# Patient Record
Sex: FEMALE | Race: WHITE | Employment: OTHER | URBAN - METROPOLITAN AREA
[De-identification: names, ages, dates, MRNs, and addresses within clinical notes are randomized per-mention and may not be internally consistent; named-entity substitution may affect disease eponyms.]

---

## 2017-02-27 ENCOUNTER — HOSPITAL ENCOUNTER (OUTPATIENT)
Dept: MRI IMAGING | Age: 72
Discharge: HOME | End: 2017-02-27
Attending: ORTHOPAEDIC SURGERY
Payer: MEDICARE

## 2017-02-27 DIAGNOSIS — M54.32 LEFT SIDED SCIATICA: ICD-10-CM

## 2017-02-27 PROCEDURE — 72148 MRI LUMBAR SPINE W/O DYE: CPT

## 2018-11-08 ENCOUNTER — HOSPITAL ENCOUNTER (OUTPATIENT)
Dept: LAB | Age: 73
Discharge: HOME | End: 2018-11-08
Payer: MEDICARE

## 2018-11-08 ENCOUNTER — OFFICE VISIT (OUTPATIENT)
Dept: FAMILY MEDICINE CLINIC | Age: 73
End: 2018-11-08

## 2018-11-08 VITALS
BODY MASS INDEX: 26.68 KG/M2 | HEIGHT: 67 IN | WEIGHT: 170 LBS | DIASTOLIC BLOOD PRESSURE: 67 MMHG | RESPIRATION RATE: 18 BRPM | TEMPERATURE: 98.1 F | HEART RATE: 69 BPM | OXYGEN SATURATION: 98 % | SYSTOLIC BLOOD PRESSURE: 114 MMHG

## 2018-11-08 DIAGNOSIS — Z13.1 SCREENING FOR DIABETES MELLITUS (DM): ICD-10-CM

## 2018-11-08 DIAGNOSIS — J01.00 ACUTE NON-RECURRENT MAXILLARY SINUSITIS: Primary | ICD-10-CM

## 2018-11-08 DIAGNOSIS — R35.0 URINARY FREQUENCY: ICD-10-CM

## 2018-11-08 DIAGNOSIS — Z00.00 ROUTINE HEALTH MAINTENANCE: ICD-10-CM

## 2018-11-08 DIAGNOSIS — E55.9 VITAMIN D DEFICIENCY: ICD-10-CM

## 2018-11-08 DIAGNOSIS — E78.5 HYPERLIPIDEMIA, UNSPECIFIED HYPERLIPIDEMIA TYPE: ICD-10-CM

## 2018-11-08 PROCEDURE — 87086 URINE CULTURE/COLONY COUNT: CPT

## 2018-11-08 PROCEDURE — 80053 COMPREHEN METABOLIC PANEL: CPT

## 2018-11-08 PROCEDURE — 85025 COMPLETE CBC W/AUTO DIFF WBC: CPT

## 2018-11-08 PROCEDURE — 81001 URINALYSIS AUTO W/SCOPE: CPT

## 2018-11-08 PROCEDURE — 82306 VITAMIN D 25 HYDROXY: CPT

## 2018-11-08 PROCEDURE — 80061 LIPID PANEL: CPT

## 2018-11-08 RX ORDER — AMOXICILLIN AND CLAVULANATE POTASSIUM 875; 125 MG/1; MG/1
1 TABLET, FILM COATED ORAL 2 TIMES DAILY
Qty: 14 TAB | Refills: 0 | Status: SHIPPED | OUTPATIENT
Start: 2018-11-08 | End: 2018-11-15

## 2018-11-08 NOTE — PATIENT INSTRUCTIONS
Sinusitis: Care Instructions  Your Care Instructions    Sinusitis is an infection of the lining of the sinus cavities in your head. Sinusitis often follows a cold. It causes pain and pressure in your head and face. In most cases, sinusitis gets better on its own in 1 to 2 weeks. But some mild symptoms may last for several weeks. Sometimes antibiotics are needed. Follow-up care is a key part of your treatment and safety. Be sure to make and go to all appointments, and call your doctor if you are having problems. It's also a good idea to know your test results and keep a list of the medicines you take. How can you care for yourself at home? · Take an over-the-counter pain medicine, such as acetaminophen (Tylenol), ibuprofen (Advil, Motrin), or naproxen (Aleve). Read and follow all instructions on the label. · If the doctor prescribed antibiotics, take them as directed. Do not stop taking them just because you feel better. You need to take the full course of antibiotics. · Be careful when taking over-the-counter cold or flu medicines and Tylenol at the same time. Many of these medicines have acetaminophen, which is Tylenol. Read the labels to make sure that you are not taking more than the recommended dose. Too much acetaminophen (Tylenol) can be harmful. · Breathe warm, moist air from a steamy shower, a hot bath, or a sink filled with hot water. Avoid cold, dry air. Using a humidifier in your home may help. Follow the directions for cleaning the machine. · Use saline (saltwater) nasal washes to help keep your nasal passages open and wash out mucus and bacteria. You can buy saline nose drops at a grocery store or drugstore. Or you can make your own at home by adding 1 teaspoon of salt and 1 teaspoon of baking soda to 2 cups of distilled water. If you make your own, fill a bulb syringe with the solution, insert the tip into your nostril, and squeeze gently. Constantino Mclean your nose.   · Put a hot, wet towel or a warm gel pack on your face 3 or 4 times a day for 5 to 10 minutes each time. · Try a decongestant nasal spray like oxymetazoline (Afrin). Do not use it for more than 3 days in a row. Using it for more than 3 days can make your congestion worse. When should you call for help? Call your doctor now or seek immediate medical care if:    · You have new or worse swelling or redness in your face or around your eyes.     · You have a new or higher fever.    Watch closely for changes in your health, and be sure to contact your doctor if:    · You have new or worse facial pain.     · The mucus from your nose becomes thicker (like pus) or has new blood in it.     · You are not getting better as expected. Where can you learn more? Go to http://ness-lee.info/. Enter J739 in the search box to learn more about \"Sinusitis: Care Instructions. \"  Current as of: March 28, 2018  Content Version: 11.8  © 7765-6134 [employer]. Care instructions adapted under license by [employer] (which disclaims liability or warranty for this information). If you have questions about a medical condition or this instruction, always ask your healthcare professional. [place] [address] any warranty or liability for your use of this information. High Cholesterol: Care Instructions  Your Care Instructions    Cholesterol is a type of fat in your blood. It is needed for many body functions, such as making new cells. Cholesterol is made by your body. It also comes from food you eat. High cholesterol means that you have too much of the fat in your blood. This raises your risk of a heart attack and stroke. LDL and HDL are part of your total cholesterol. LDL is the \"bad\" cholesterol. High LDL can raise your risk for heart disease, heart attack, and stroke. HDL is the \"good\" cholesterol. It helps clear bad cholesterol from the body.  High HDL is linked with a lower risk of heart disease, heart attack, and stroke. Your cholesterol levels help your doctor find out your risk for having a heart attack or stroke. You and your doctor can talk about whether you need to lower your risk and what treatment is best for you. A heart-healthy lifestyle along with medicines can help lower your cholesterol and your risk. The way you choose to lower your risk will depend on how high your risk is for heart attack and stroke. It will also depend on how you feel about taking medicines. Follow-up care is a key part of your treatment and safety. Be sure to make and go to all appointments, and call your doctor if you are having problems. It's also a good idea to know your test results and keep a list of the medicines you take. How can you care for yourself at home? · Eat a variety of foods every day. Good choices include fruits, vegetables, whole grains (like oatmeal), dried beans and peas, nuts and seeds, soy products (like tofu), and fat-free or low-fat dairy products. · Replace butter, margarine, and hydrogenated or partially hydrogenated oils with olive and canola oils. (Canola oil margarine without trans fat is fine.)  · Replace red meat with fish, poultry, and soy protein (like tofu). · Limit processed and packaged foods like chips, crackers, and cookies. · Bake, broil, or steam foods. Don't arnold them. · Be physically active. Get at least 30 minutes of exercise on most days of the week. Walking is a good choice. You also may want to do other activities, such as running, swimming, cycling, or playing tennis or team sports. · Stay at a healthy weight or lose weight by making the changes in eating and physical activity listed above. Losing just a small amount of weight, even 5 to 10 pounds, can reduce your risk for having a heart attack or stroke. · Do not smoke. When should you call for help?   Watch closely for changes in your health, and be sure to contact your doctor if:    · You need help making lifestyle changes.     · You have questions about your medicine. Where can you learn more? Go to http://ness-lee.info/. Enter B719 in the search box to learn more about \"High Cholesterol: Care Instructions. \"  Current as of: December 6, 2017  Content Version: 11.8  © 3285-6853 [employer]. Care instructions adapted under license by [facility] (which disclaims liability or warranty for this information). If you have questions about a medical condition or this instruction, always ask your healthcare professional. [address] any warranty or liability for your use of this information.

## 2018-11-08 NOTE — PROGRESS NOTES
Chief Complaint   Patient presents with    Nasal Congestion     pt states she has had a sinus infection for over a month. lots of post nasal drainage in throat. taking Mucinex    Cough     reports yellow mucus with cough. \"REVIEWED RECORD IN PREPARATION FOR VISIT AND HAVE OBTAINED THE NECESSARY DOCUMENTATION\"  1. Have you been to the ER, urgent care clinic since your last visit? Hospitalized since your last visit? No    2. Have you seen or consulted any other health care providers outside of the [address] [place] [address] since your last visit? Include any pap smears or colon screening.  No

## 2018-11-08 NOTE — PROGRESS NOTES
[facility] Note  Subjective:      Verlean Runner is a 75 y.o. female who presents for an acute visit with the following chief complaints. Chief Complaint   Patient presents with    Nasal Congestion     pt states she has had a sinus infection for over a month. lots of post nasal drainage in throat. taking Mucinex    Cough     reports yellow mucus with cough. Sinus Pain  Patient complains of congestion, facial pain, post nasal drip and sinus pressure. Associated symptoms include right ear tenderness, and intermittent bilateral dull headache. Onset of symptoms was 4 weeks ago, unchanged since that time. She is drinking plenty of fluids. Past history is significant for no history of pneumonia or bronchitis and prone to sinus infections. Patient is non-smoker. She would like to have routine lab work today. She is fasting. She has not had labs in a few years. History of elevated cholesterol but she did not tolerate Crestor secondary to lower extremities muscle weakness, and altered taste with fish oil. She has been off medication for over a year. She has been watching her diet, she is active with ADL's. She is agreeable to return for a CPE to further discuss health maintenance. She is not taking any daily medication at this time. History of osteoarthritis, managed by Ortho. She was told she may need knee and hip replacement but she is delaying for now. History of transverse colon hernia, follow-up by GI. No recent persistant abdominal pain. Current Outpatient Medications   Medication Sig Dispense Refill    amoxicillin-clavulanate (AUGMENTIN) 875-125 mg per tablet Take 1 Tab by mouth two (2) times a day for 7 days. 14 Tab 0     No Known Allergies    ROS:   Complete review of systems was reviewed with pertinent information listed in HPI. Review of Systems   Constitutional: Negative for chills, diaphoresis, fever, malaise/fatigue and weight loss. HENT: Positive for congestion and sinus pain. Negative for ear pain, hearing loss, sore throat and tinnitus. Eyes: Negative for blurred vision. Respiratory: Positive for cough. Negative for sputum production, shortness of breath and wheezing. Cardiovascular: Negative for chest pain and palpitations. Gastrointestinal: Negative for abdominal pain, diarrhea, heartburn, nausea and vomiting. Genitourinary: Positive for frequency. Negative for dysuria, flank pain, hematuria and urgency. Urinates often but drinking lots of water. Musculoskeletal: Negative for myalgias. Skin: Negative for rash. Neurological: Negative for dizziness and headaches. Endo/Heme/Allergies: Negative for polydipsia. Psychiatric/Behavioral: Negative for depression. The patient is not nervous/anxious and does not have insomnia. Objective:     Visit Vitals  /67 (BP 1 Location: Left arm, BP Patient Position: Sitting)   Pulse 69   Temp 98.1 °F (36.7 °C) (Oral)   Resp 18   Ht 5' 7\" (1.702 m)   Wt 170 lb (77.1 kg)   SpO2 98%   BMI 26.63 kg/m²       Vitals and Nurse Documentation reviewed. Physical Exam   Constitutional: She is oriented to person, place, and time and well-developed, well-nourished, and in no distress. Vital signs are normal. She does not have a sickly appearance. HENT:   Head: Normocephalic and atraumatic. Right Ear: Hearing, external ear and ear canal normal. Tympanic membrane is erythematous. Tympanic membrane is not bulging. A middle ear effusion is present. Left Ear: Hearing, external ear and ear canal normal. Tympanic membrane is not erythematous and not bulging. No middle ear effusion. Nose: Mucosal edema present. No rhinorrhea. Right sinus exhibits maxillary sinus tenderness and frontal sinus tenderness. Left sinus exhibits frontal sinus tenderness. Left sinus exhibits no maxillary sinus tenderness.    Mouth/Throat: Uvula is midline, oropharynx is clear and moist and mucous membranes are normal. Mucous membranes are not pale, not dry and not cyanotic. No oropharyngeal exudate, posterior oropharyngeal edema, posterior oropharyngeal erythema or tonsillar abscesses. Right nasal mucosal erythema and edema. Eyes: Conjunctivae are normal. Pupils are equal, round, and reactive to light. Right conjunctiva is not injected. Left conjunctiva is not injected. Neck: Normal range of motion and phonation normal. Neck supple. No tracheal deviation present. No thyromegaly present. Cardiovascular: Normal rate, regular rhythm, S1 normal, S2 normal, normal heart sounds and intact distal pulses. Exam reveals no gallop and no friction rub. No murmur heard. Pulmonary/Chest: Effort normal. No respiratory distress. She has no decreased breath sounds. She has no wheezes. She has no rhonchi. She has no rales. She exhibits no tenderness. Abdominal: Soft. Normal appearance and bowel sounds are normal. She exhibits no distension. There is no hepatosplenomegaly. There is no tenderness. Musculoskeletal: She exhibits no edema. Lymphadenopathy:     She has no cervical adenopathy. Right: No supraclavicular adenopathy present. Left: No supraclavicular adenopathy present. Neurological: She is alert and oriented to person, place, and time. Gait normal.   Skin: Skin is warm, dry and intact. No rash noted. She is not diaphoretic. No cyanosis. Nails show no clubbing. Psychiatric: Mood, memory, affect and judgment normal.   Nursing note and vitals reviewed. Assessment/Plan:     Diagnoses and all orders for this visit:    1. Acute non-recurrent maxillary sinusitis: Acute, sinusitis ongoing for 4 weeks without resolution with conservative therapy. Start antibiotic therapy; Risks and benefits reviewed. No constitutional symptoms. Nasal saline rinses   -     amoxicillin-clavulanate (AUGMENTIN) 875-125 mg per tablet; Take 1 Tab by mouth two (2) times a day for 7 days.     2. Vitamin D deficiency  -     VITAMIN D, 25 HYDROXY    3. Hyperlipidemia, unspecified hyperlipidemia type: History of elevated LDL, did not tolerate Crestor secondary to leg weakness. She has been off therapy for over a year. Following low cholesterol diet, active with ADL's. She would like to have fasting labs. -     LIPID PANEL    4. Screening for diabetes mellitus (DM)  -     METABOLIC PANEL, COMPREHENSIVE    5. Urinary frequency: Chronic urinary frequency without other signs of UTI, will obtain UA today. Screen for Dm as above. -     UA/M W/RFLX CULTURE, ROUTINE    6. Routine health maintenance: She is requesting labs today and she is agreeable to return for CPE in 3 weeks to further discuss health maintenance.    -     METABOLIC PANEL, COMPREHENSIVE  -     CBC WITH AUTOMATED DIFF      Follow-up Disposition:  Return in about 3 weeks (around 11/29/2018) for Routine Physical Exam.    Discussed expected course/resolution/complications of diagnosis in detail with patient.    Medication risks/benefits/costs/interactions/alternatives discussed with patient.    Pt was given an after visit summary which includes diagnoses, current medications & vitals.  Pt expressed understanding with the diagnosis and plan

## 2018-11-10 LAB
25(OH)D3+25(OH)D2 SERPL-MCNC: 25 NG/ML (ref 30–100)
ALBUMIN SERPL-MCNC: 4.2 G/DL (ref 3.5–4.8)
ALBUMIN/GLOB SERPL: 2.3 {RATIO} (ref 1.2–2.2)
ALP SERPL-CCNC: 104 IU/L (ref 39–117)
ALT SERPL-CCNC: 9 IU/L (ref 0–32)
APPEARANCE UR: CLEAR
AST SERPL-CCNC: 17 IU/L (ref 0–40)
BACTERIA #/AREA URNS HPF: NORMAL /[HPF]
BACTERIA UR CULT: NO GROWTH
BASOPHILS # BLD AUTO: 0 X10E3/UL (ref 0–0.2)
BASOPHILS NFR BLD AUTO: 1 %
BILIRUB SERPL-MCNC: 0.4 MG/DL (ref 0–1.2)
BILIRUB UR QL STRIP: NEGATIVE
BUN SERPL-MCNC: 16 MG/DL (ref 8–27)
BUN/CREAT SERPL: 18 (ref 12–28)
CALCIUM SERPL-MCNC: 9.4 MG/DL (ref 8.7–10.3)
CASTS URNS QL MICRO: NORMAL /LPF
CHLORIDE SERPL-SCNC: 108 MMOL/L (ref 96–106)
CHOLEST SERPL-MCNC: 214 MG/DL (ref 100–199)
CO2 SERPL-SCNC: 25 MMOL/L (ref 20–29)
COLOR UR: YELLOW
CREAT SERPL-MCNC: 0.88 MG/DL (ref 0.57–1)
EOSINOPHIL # BLD AUTO: 0.2 X10E3/UL (ref 0–0.4)
EOSINOPHIL NFR BLD AUTO: 4 %
EPI CELLS #/AREA URNS HPF: NORMAL /HPF
ERYTHROCYTE [DISTWIDTH] IN BLOOD BY AUTOMATED COUNT: 13.7 % (ref 12.3–15.4)
GLOBULIN SER CALC-MCNC: 1.8 G/DL (ref 1.5–4.5)
GLUCOSE SERPL-MCNC: 96 MG/DL (ref 65–99)
GLUCOSE UR QL: NEGATIVE
HCT VFR BLD AUTO: 42.2 % (ref 34–46.6)
HDLC SERPL-MCNC: 64 MG/DL
HGB BLD-MCNC: 14.2 G/DL (ref 11.1–15.9)
HGB UR QL STRIP: NEGATIVE
IMM GRANULOCYTES # BLD: 0 X10E3/UL (ref 0–0.1)
IMM GRANULOCYTES NFR BLD: 0 %
INTERPRETATION, 910389: NORMAL
KETONES UR QL STRIP: NEGATIVE
LDLC SERPL CALC-MCNC: 137 MG/DL (ref 0–99)
LEUKOCYTE ESTERASE UR QL STRIP: ABNORMAL
LYMPHOCYTES # BLD AUTO: 1.8 X10E3/UL (ref 0.7–3.1)
LYMPHOCYTES NFR BLD AUTO: 34 %
MCH RBC QN AUTO: 30.5 PG (ref 26.6–33)
MCHC RBC AUTO-ENTMCNC: 33.6 G/DL (ref 31.5–35.7)
MCV RBC AUTO: 91 FL (ref 79–97)
MICRO URNS: ABNORMAL
MONOCYTES # BLD AUTO: 0.5 X10E3/UL (ref 0.1–0.9)
MONOCYTES NFR BLD AUTO: 10 %
NEUTROPHILS # BLD AUTO: 2.6 X10E3/UL (ref 1.4–7)
NEUTROPHILS NFR BLD AUTO: 51 %
NITRITE UR QL STRIP: NEGATIVE
PH UR STRIP: 5.5 [PH] (ref 5–7.5)
PLATELET # BLD AUTO: 198 X10E3/UL (ref 150–379)
POTASSIUM SERPL-SCNC: 4.3 MMOL/L (ref 3.5–5.2)
PROT SERPL-MCNC: 6 G/DL (ref 6–8.5)
PROT UR QL STRIP: NEGATIVE
RBC # BLD AUTO: 4.65 X10E6/UL (ref 3.77–5.28)
RBC #/AREA URNS HPF: NORMAL /HPF
SODIUM SERPL-SCNC: 144 MMOL/L (ref 134–144)
SP GR UR: 1.02 (ref 1–1.03)
TRIGL SERPL-MCNC: 65 MG/DL (ref 0–149)
URINALYSIS REFLEX, 377202: ABNORMAL
UROBILINOGEN UR STRIP-MCNC: 0.2 MG/DL (ref 0.2–1)
VLDLC SERPL CALC-MCNC: 13 MG/DL (ref 5–40)
WBC # BLD AUTO: 5.1 X10E3/UL (ref 3.4–10.8)
WBC #/AREA URNS HPF: NORMAL /HPF

## 2018-11-12 NOTE — PROGRESS NOTES
(Mychart message) Your complete blood count, screening for infection and anemia, is normal. Your urine was within normal limits. Your cholesterol is improved from 2 years ago. Continue to follow a low cholesterol diet and routine exercise. I advise repeat labs in 6 months. Your kidney function, liver function, blood sugar and electrolytes are normal. Your vitamin D is low. I recommend a daily vitamin D3 supplement 8,00 units daily. Please let me know if you have any additional questions.     Darryle Mimes, NP

## 2018-11-29 ENCOUNTER — OFFICE VISIT (OUTPATIENT)
Dept: FAMILY MEDICINE CLINIC | Age: 73
End: 2018-11-29

## 2018-11-29 VITALS
HEART RATE: 73 BPM | RESPIRATION RATE: 18 BRPM | TEMPERATURE: 98.5 F | OXYGEN SATURATION: 97 % | DIASTOLIC BLOOD PRESSURE: 73 MMHG | BODY MASS INDEX: 26.53 KG/M2 | HEIGHT: 67 IN | WEIGHT: 169 LBS | SYSTOLIC BLOOD PRESSURE: 118 MMHG

## 2018-11-29 DIAGNOSIS — H69.81 DYSFUNCTION OF RIGHT EUSTACHIAN TUBE: ICD-10-CM

## 2018-11-29 DIAGNOSIS — Z00.00 MEDICARE ANNUAL WELLNESS VISIT, SUBSEQUENT: Primary | ICD-10-CM

## 2018-11-29 DIAGNOSIS — R51.9 FREQUENT HEADACHES: ICD-10-CM

## 2018-11-29 DIAGNOSIS — M15.9 OSTEOARTHRITIS OF MULTIPLE JOINTS, UNSPECIFIED OSTEOARTHRITIS TYPE: ICD-10-CM

## 2018-11-29 RX ORDER — CHOLECALCIFEROL (VITAMIN D3) 125 MCG
CAPSULE ORAL
COMMUNITY
End: 2020-10-10

## 2018-11-29 NOTE — PROGRESS NOTES
[address] [facility] Clinic Note HPI:  
Selma Jimenez is a 75 y.o. female who presents for annual medicare wellness exam. 
 
Chief Complaint Patient presents with  Nasal Congestion  
  pt states her previous sinus infection had gotten better and now she feels like she is getting another sinus infection with mucus in throat again. Previously treated for sinus infection. She had full relief of symptoms. Now with post nasal drip. Denies associated symptoms. Taking Mucinex as of a few days ago. Complaints of Ear ache. Onset a few months ago. Right ear. Pain is come on quickly and last a few seconds to minutes. Occurs a few times per day for a few days before resolving. Pain is intense aching. Deneis ear fullness, tinnitus, dizziness. History of TMJ syndrome and is carefeull aout what she eats on the right side. Arthritis of both hands, knees and hips. Managed by an orthopedic specialist in [place]. Wearing a hand brace for prolonged activity. Also gets steroid injections periodically. [place]. Pain in hip and knee don't bother her unless there is again prolonged activity. Eye doctor is Dr. Dai Sepulveda in [place]. History of cataracts of boht eyes. GYN: Dr. Maryuri Ayala at [facility]. Last bone density screenign was improvement after being on fosamax for 2 years. Now just taking vitamin D. She is due for repeat imaging. Trying to remain active. Mammograms are regular. Waking up with headache for about 1 year. Alleviated with food. But if she does not eat headache will persist and cause nausea, she may throw-up and its yellow. This will happen 3 times int he morning before resolving. This is happening serveral times a month for the past year. This is different from previous mirgraines in that she is not having photophobia or noise sensitivity. Health Maintenance Topic Date Due  Shingrix Vaccine Age 50> (1 of 2) 10/14/1994  Bone Densitometry (Dexa) Screening  10/14/2009  DTaP/Tdap/Td series (1 - Tdap) 03/26/2015  GLAUCOMA SCREENING Q2Y  03/24/2017  BREAST CANCER SCRN MAMMOGRAM  01/13/2018  MEDICARE YEARLY EXAM  10/29/2018  COLONOSCOPY  12/12/2026  Pneumococcal 65+ Low/Medium Risk  Completed  Influenza Age 5 to Adult  Completed Health Maintenance reviewed Mammogram: 2017 Will obtain from GYN Colonoscopy: 2016 Dexa Scan: She believes 2017. Will obtain from GYN Current Outpatient Medications Medication Sig Dispense Refill  cholecalciferol, vitamin D3, (VITAMIN D3) 2,000 unit tab Take  by mouth. No Known Allergies Immunization History Administered Date(s) Administered  Influenza High Dose Vaccine PF 11/02/2016  Influenza Vaccine 10/17/2018  Influenza Vaccine Split 11/11/2011  Pneumococcal Conjugate (PCV-13) 03/25/2015  Pneumococcal Polysaccharide (PPSV-23) 07/30/2013  Td, Adsorbed PF 03/25/2015  Zoster Vaccine, Live 10/01/2013 Patient Active Problem List  
Diagnosis Code  Other and unspecified hyperlipidemia E78.5  Osteoporosis M81.0  Vitamin D deficiency E55.9  SBO (small bowel obstruction) ([facility] [address] K56.609 Past Medical History:  
Diagnosis Date  GERD (gastroesophageal reflux disease)  Hypercholesterolemia  Migraine  Osteoarthritis   
 hand institue, Dr. Katerine Ambriz. Treatments: heat.  Other ill-defined conditions(799.89)   
 high cholesterol Past Surgical History:  
Procedure Laterality Date  COLONOSCOPY N/A 12/12/2016 COLONOSCOPY performed by Jovi Noyola MD at [address] HX BLADDER SUSPENSION    
 HX CATARACT REMOVAL  2012  
 both eyes  HX GYN    
 hysterectomy  HX ORTHOPAEDIC    
 RT carpal tunnel surgery  HX ORTHOPAEDIC    
 LFT foot broken and tore tendons  HX OTHER SURGICAL  03/2012  
 sx for intestinal blockage [id number]  HX UROLOGICAL bladder suspension No LMP recorded. Patient has had a hysterectomy. Family History Problem Relation Age of Onset  Cancer Mother   
     breast  
  
Social History Socioeconomic History  Marital status:  Spouse name: Not on file  Number of children: Not on file  Years of education: Not on file  Highest education level: Not on file Social Needs  Financial resource strain: Not on file  Food insecurity - worry: Not on file  Food insecurity - inability: Not on file  Transportation needs - medical: Not on file  Transportation needs - non-medical: Not on file Occupational History  Not on file Tobacco Use  Smoking status: Never Smoker  Smokeless tobacco: Never Used Substance and Sexual Activity  Alcohol use: No  
 Drug use: No  
 Sexual activity: No  
Other Topics Concern   Service No  
 Blood Transfusions No  
 Caffeine Concern No  
 Occupational Exposure No  
 Hobby Hazards No  
 Sleep Concern Yes  Stress Concern Yes  Weight Concern No  
 Special Diet No  
 Back Care Yes  Exercise Yes  Bike Helmet Not Asked [address] Yes  Self-Exams Yes Social History Narrative  Not on file ROS:  
 
Review of Systems Constitutional: Negative for chills, fever and malaise/fatigue. No unexplained weight changes HENT: Positive for congestion. Negative for hearing loss, sore throat and tinnitus. Eyes: Negative for blurred vision. Respiratory: Negative for cough, shortness of breath and wheezing. Cardiovascular: Negative for chest pain, palpitations, orthopnea, claudication and leg swelling. Gastrointestinal: Negative for abdominal pain, blood in stool, constipation, diarrhea, heartburn, nausea and vomiting. Genitourinary: Negative for dysuria, frequency and urgency. Musculoskeletal: Positive for joint pain. Negative for myalgias. Skin: Negative for rash. Neurological: Positive for headaches. Negative for dizziness, speech change, focal weakness, seizures and weakness. Endo/Heme/Allergies: Negative for polydipsia. Psychiatric/Behavioral: Negative for depression, substance abuse and suicidal ideas. The patient is not nervous/anxious and does not have insomnia. Physical Exam:  
 
Visit Vitals /73 (BP 1 Location: Left arm, BP Patient Position: Sitting) Pulse 73 Temp 98.5 °F (36.9 °C) (Oral) Resp 18 Ht 5' 7\" (1.702 m) Wt 169 lb (76.7 kg) SpO2 97% BMI 26.47 kg/m² Physical Exam  
Constitutional: She is oriented to person, place, and time. She appears well-developed and well-nourished. HENT:  
Head: Normocephalic and atraumatic. Right Ear: Hearing, external ear and ear canal normal. Tympanic membrane is retracted. Tympanic membrane is not injected and not scarred. No middle ear effusion. Left Ear: Hearing, tympanic membrane, external ear and ear canal normal. Tympanic membrane is not injected and not scarred. No middle ear effusion. Nose: Nose normal. No mucosal edema, rhinorrhea or septal deviation. Mouth/Throat: Uvula is midline and oropharynx is clear and moist. Mucous membranes are not pale, not dry and not cyanotic. Normal dentition. No oropharyngeal exudate. Eyes: Conjunctivae and lids are normal. Pupils are equal, round, and reactive to light. Neck: Normal range of motion. Neck supple. No tracheal deviation present. No thyromegaly present. Cardiovascular: Normal rate, regular rhythm, S1 normal, S2 normal, normal heart sounds and intact distal pulses. Exam reveals no gallop and no friction rub. No murmur heard. Pulses: 
     Radial pulses are 2+ on the right side, and 2+ on the left side. Dorsalis pedis pulses are 2+ on the right side, and 2+ on the left side.   
Pulmonary/Chest: Effort normal and breath sounds normal. No respiratory distress. She has no decreased breath sounds. She has no wheezes. She has no rhonchi. She has no rales. She exhibits no tenderness. Abdominal: Soft. Normal appearance and bowel sounds are normal. She exhibits no distension and no mass. There is no hepatosplenomegaly. There is no tenderness. There is no rebound and no guarding. Musculoskeletal: Normal range of motion. She exhibits no edema, tenderness or deformity. Lymphadenopathy:  
  She has no cervical adenopathy. Neurological: She is alert and oriented to person, place, and time. She has normal strength. No cranial nerve deficit or sensory deficit. Gait normal.  
Skin: Skin is warm, dry and intact. No cyanosis. Nails show no clubbing. Psychiatric: She has a normal mood and affect. Her behavior is normal.  
Nursing note and vitals reviewed. Assessment/ Plan:  
Full age appropriate History and Physical exam as well as health care maintenance  performed and discussed today. Risk factor modification discussed today includes safe sex practices, healthy diet and exercise, and seat belt use. Continue current medications. Diagnoses and all orders for this visit: 
 
1. Medicare annual wellness visit, subsequent: See additional Note for MWV. Will obtain Mammograms, DEXA from GYN as well as recent eye exam records. She declines shingles vaccine at this time. 2. Osteoarthritis of multiple joints, unspecified osteoarthritis type: Stable, managed by orthopedic specialists. 3. Dysfunction of right eustachian tube: Start intranasal steroids for 3-4 weeks. RTC if symptoms worsen or do not resolve. Consider ENT referral for continued symptoms. 4. Frequent headaches; New headache symptoms different from migraine symptoms, ongoing for about 1 year. Referral to neurology for further evaluation.  
-     REFERRAL TO NEUROLOGY Follow-up Disposition: 
Return in about 6 months (around 5/29/2019) for Follow-up. Discussed expected course/resolution/complications of diagnosis in detail with patient.   
Medication risks/benefits/costs/interactions/alternatives discussed with patient.   
Pt was given an after visit summary which includes diagnoses, current medications & vitals.  Pt expressed understanding with the diagnosis and plan.

## 2018-11-29 NOTE — PROGRESS NOTES
This is the Subsequent Medicare Annual Wellness Exam, performed 12 months or more after the Initial AWV or the last Subsequent AWV I have reviewed the patient's medical history in detail and updated the computerized patient record. History Past Medical History:  
Diagnosis Date  GERD (gastroesophageal reflux disease)  Hypercholesterolemia  Migraine  Osteoarthritis   
 hand institue, Dr. Romina Vanegas. Treatments: heat.  Other ill-defined conditions(799.89)   
 high cholesterol Past Surgical History:  
Procedure Laterality Date  COLONOSCOPY N/A 12/12/2016 COLONOSCOPY performed by Marge Dewitt MD at [facility] HX BLADDER SUSPENSION    
 HX CATARACT REMOVAL  2012  
 both eyes  HX GYN    
 hysterectomy  HX ORTHOPAEDIC    
 RT carpal tunnel surgery  HX ORTHOPAEDIC    
 LFT foot broken and tore tendons  HX OTHER SURGICAL  03/2012  
 sx for intestinal blockage [address]  HX UROLOGICAL    
 bladder suspension Current Outpatient Medications Medication Sig Dispense Refill  cholecalciferol, vitamin D3, (VITAMIN D3) 2,000 unit tab Take  by mouth. No Known Allergies Family History Problem Relation Age of Onset  Cancer Mother   
     breast  
 
Social History Tobacco Use  Smoking status: Never Smoker  Smokeless tobacco: Never Used Substance Use Topics  Alcohol use: No  
 
Patient Active Problem List  
Diagnosis Code  Other and unspecified hyperlipidemia E78.5  Osteoporosis M81.0  Vitamin D deficiency E55.9  SBO (small bowel obstruction) ([facility] [address] K56.609 Depression Risk Factor Screening: PHQ over the last two weeks 11/8/2018 Little interest or pleasure in doing things Not at all Feeling down, depressed, irritable, or hopeless Not at all Total Score PHQ 2 0 Alcohol Risk Factor Screening: You do not drink alcohol or very rarely. Functional Ability and Level of Safety: Hearing Loss Hearing is good. Activities of Daily Living The home contains: no safety equipment. Patient does total self care Fall Risk Fall Risk Assessment, last 12 mths 11/8/2018 Able to walk? Yes Fall in past 12 months? No  
 
 
Abuse Screen Patient is not abused Cognitive Screening Evaluation of Cognitive Function: 
Has your family/caregiver stated any concerns about your memory: no 
Normal 
 
Patient Care Team  
Patient Care Team: 
Cathy Farley MD as PCP - General 
 
Assessment/Plan Education and counseling provided: 
Are appropriate based on today's review and evaluation End-of-Life planning (with patient's consent) Influenza Vaccine Screening Mammography Colorectal cancer screening tests Cardiovascular screening blood test 
Bone mass measurement (DEXA) Screening for glaucoma Diabetes screening test 
 
Diagnoses and all orders for this visit: 
 
1. Medicare annual wellness visit, subsequent 2. Osteoarthritis of multiple joints, unspecified osteoarthritis type 3. Dysfunction of right eustachian tube Health Maintenance Due Topic Date Due  Shingrix Vaccine Age 50> (1 of 2) 10/14/1994  Bone Densitometry (Dexa) Screening  10/14/2009  DTaP/Tdap/Td series (1 - Tdap) 03/26/2015  GLAUCOMA SCREENING Q2Y  03/24/2017  BREAST CANCER SCRN MAMMOGRAM  01/13/2018  MEDICARE YEARLY EXAM  10/29/2018

## 2018-11-29 NOTE — PROGRESS NOTES
Chief Complaint Patient presents with  Nasal Congestion  
  pt states her previous sinus infection had gotten better and now she feels like she is getting another sinus infection with mucus in throat again. \"REVIEWED RECORD IN PREPARATION FOR VISIT AND HAVE OBTAINED THE NECESSARY DOCUMENTATION\" 1. Have you been to the ER, urgent care clinic since your last visit? Hospitalized since your last visit? No 
 
2. Have you seen or consulted any other health care providers outside of the [address] [place] [address] since your last visit? Include any pap smears or colon screening.  No

## 2018-11-29 NOTE — PATIENT INSTRUCTIONS
Start Flonase 2 sprays to each nostril daily for atleast 3-4 weeks. You can also try nasal saline rinses. Return to clinic if symptoms worsen or do not resolve. Neurology for evaluation of new headaches. Medicare Wellness Visit, Female The best way to live healthy is to have a lifestyle where you eat a well-balanced diet, exercise regularly, limit alcohol use, and quit all forms of tobacco/nicotine, if applicable. Regular preventive services are another way to keep healthy. Preventive services (vaccines, screening tests, monitoring & exams) can help personalize your care plan, which helps you manage your own care. Screening tests can find health problems at the earliest stages, when they are easiest to treat. [facility] follows the current, evidence-based guidelines published by the [facility] Denis Abby ([facility]) when recommending preventive services for our patients. Because we follow these guidelines, sometimes recommendations change over time as research supports it. (For example, mammograms used to be recommended annually. Even though Medicare will still pay for an annual mammogram, the newer guidelines recommend a mammogram every two years for women of average risk.) Of course, you and your doctor may decide to screen more often for some diseases, based on your risk and your health status. Preventive services for you include: - Medicare offers their members a free annual wellness visit, which is time for you and your primary care provider to discuss and plan for your preventive service needs. Take advantage of this benefit every year! 
-All adults over the age of 72 should receive the recommended pneumonia vaccines. Current USPSTF guidelines recommend a series of two vaccines for the best pneumonia protection.  
-All adults should have a flu vaccine yearly and a tetanus vaccine every 10 years.  All adults age 61 and older should receive a shingles vaccine once in their lifetime.   
-A bone mass density test is recommended when a woman turns 65 to screen for osteoporosis. This test is only recommended one time, as a screening. Some providers will use this same test as a disease monitoring tool if you already have osteoporosis. -All adults age 38-68 who are overweight should have a diabetes screening test once every three years.  
-Other screening tests and preventive services for persons with diabetes include: an eye exam to screen for diabetic retinopathy, a kidney function test, a foot exam, and stricter control over your cholesterol.  
-Cardiovascular screening for adults with routine risk involves an electrocardiogram (ECG) at intervals determined by your doctor.  
-Colorectal cancer screenings should be done for adults age 54-65 with no increased risk factors for colorectal cancer. There are a number of acceptable methods of screening for this type of cancer. Each test has its own benefits and drawbacks. Discuss with your doctor what is most appropriate for you during your annual wellness visit. The different tests include: colonoscopy (considered the best screening method), a fecal occult blood test, a fecal DNA test, and sigmoidoscopy. -Breast cancer screenings are recommended every other year for women of normal risk, age 54-69. 
-Cervical cancer screenings for women over age 72 are only recommended with certain risk factors.  
-All adults born between [facility][1] should be screened once for Hepatitis C. Here is a list of your current Health Maintenance items (your personalized list of preventive services) with a due date: 
Health Maintenance Due Topic Date Due  Shingles Vaccine (1 of 2) 10/14/1994  Bone Mineral Density   10/14/2009  DTaP/Tdap/Td  (1 - Tdap) 03/26/2015  Glaucoma Screening   03/24/2017  Breast Cancer Screening  01/13/2018 [address] [place] [address] Annual Well Visit  10/29/2018 Eustachian Tube Problems: Care Instructions Your Care Instructions The eustachian (say \"you-STAY-shee-un\") tubes run between the inside of the ears and the throat. They keep air pressure stable in the ears. If your eustachian tubes become blocked, the air pressure in your ears changes. The fluids from a cold can clog eustachian tubes, causing pain in the ears. A quick change in air pressure can cause eustachian tubes to close up. This might happen when an airplane changes altitude or when a  goes up or down underwater. Eustachian tube problems often clear up on their own or after antibiotic treatment. If your tubes continue to be blocked, you may need surgery. Follow-up care is a key part of your treatment and safety. Be sure to make and go to all appointments, and call your doctor if you are having problems. It's also a good idea to know your test results and keep a list of the medicines you take. How can you care for yourself at home? · To ease ear pain, apply a warm washcloth or a heating pad set on low. There may be some drainage from the ear when the heat melts earwax. Put a cloth between the heat source and your skin. Do not use a heating pad with children. · If your doctor prescribed antibiotics, take them as directed. Do not stop taking them just because you feel better. You need to take the full course of antibiotics. · Your doctor may recommend over-the-counter medicine. Be safe with medicines. Oral or nasal decongestants may relieve ear pain. Avoid decongestants that are combined with antihistamines, which tend to cause more blockage. But if allergies seem to be the problem, your doctor may recommend a combination. Be careful with cough and cold medicines. Don't give them to children younger than 6, because they don't work for children that age and can even be harmful. For children 6 and older, always follow all the instructions carefully.  Make sure you know how much medicine to give and how long to use it. And use the dosing device if one is included. When should you call for help? Call your doctor now or seek immediate medical care if: 
  · You develop sudden, complete hearing loss.  
  · You have severe pain or feel dizzy.  
  · You have new or increasing pus or blood draining from your ear.  
  · You have redness, swelling, or pain around or behind the ear.  
 Watch closely for changes in your health, and be sure to contact your doctor if: 
  · You do not get better after 2 weeks.  
  · You have any new symptoms, such as itching or a feeling of fullness in the ear. Where can you learn more? Go to http://ness-lee.info/. Enter Y822 in the search box to learn more about \"Eustachian Tube Problems: Care Instructions. \" Current as of: March 28, 2018 Content Version: 11.8 © 4081-0308 [employer]. Care instructions adapted under license by [employer] (which disclaims liability or warranty for this information). If you have questions about a medical condition or this instruction, always ask your healthcare professional. [address] any warranty or liability for your use of this information.

## 2018-11-29 NOTE — PROGRESS NOTES
Chief Complaint Patient presents with  Nasal Congestion  
  pt states her previous sinus infection had gotten better and now she feels like she is getting another sinus infection with mucus in throat again.

## 2019-01-10 ENCOUNTER — OFFICE VISIT (OUTPATIENT)
Dept: NEUROLOGY | Age: 74
End: 2019-01-10

## 2019-01-10 VITALS
OXYGEN SATURATION: 97 % | BODY MASS INDEX: 26.53 KG/M2 | DIASTOLIC BLOOD PRESSURE: 80 MMHG | SYSTOLIC BLOOD PRESSURE: 130 MMHG | RESPIRATION RATE: 16 BRPM | WEIGHT: 169 LBS | HEIGHT: 67 IN | HEART RATE: 81 BPM

## 2019-01-10 DIAGNOSIS — G44.52 NEW DAILY PERSISTENT HEADACHE: ICD-10-CM

## 2019-01-10 DIAGNOSIS — G43.709 CHRONIC MIGRAINE W/O AURA W/O STATUS MIGRAINOSUS, NOT INTRACTABLE: Primary | ICD-10-CM

## 2019-01-10 RX ORDER — METHYLPREDNISOLONE 4 MG/1
TABLET ORAL
Qty: 1 DOSE PACK | Refills: 0 | Status: SHIPPED | OUTPATIENT
Start: 2019-01-10 | End: 2019-03-18 | Stop reason: ALTCHOICE

## 2019-01-10 RX ORDER — RIZATRIPTAN BENZOATE 5 MG/1
5 TABLET, ORALLY DISINTEGRATING ORAL AS NEEDED
Qty: 9 TAB | Refills: 2 | Status: SHIPPED | OUTPATIENT
Start: 2019-01-10 | End: 2019-08-08

## 2019-01-10 NOTE — PATIENT INSTRUCTIONS
PRESCRIPTION REFILL POLICY [facility] Statement to Patients April 1, 2014 In an effort to ensure the large volume of patient prescription refills is processed in the most efficient and expeditious manner, we are asking our patients to assist us by calling your Pharmacy for all prescription refills, this will include also your  Mail Order Pharmacy. The pharmacy will contact our office electronically to continue the refill process. Please do not wait until the last minute to call your pharmacy. We need at least 48 hours (2days) to fill prescriptions. We also encourage you to call your pharmacy before going to  your prescription to make sure it is ready. With regard to controlled substance prescription refill requests (narcotic refills) that need to be picked up at our office, we ask your cooperation by providing us with at least 72 hours (3days) notice that you will need a refill. We will not refill narcotic prescription refill requests after 4:00pm on any weekday, Monday through Thursday, or after 2:00pm on Fridays, or on the weekends. We encourage everyone to explore another way of getting your prescription refill request processed using [employer], our patient web portal through our electronic medical record system. [employer] is an efficient and effective way to communicate your medication request directly to the office and  downloadable as an monika on your smart phone . [employer] also features a review functionality that allows you to view your medication list as well as leave messages for your physician. Are you ready to get connected? If so please review the attatched instructions or speak to any of our staff to get you set up right away! Thank you so much for your cooperation. Should you have any questions please contact our Practice Administrator. The Physicians and Staff,  [facility]

## 2019-01-10 NOTE — PROGRESS NOTES
Chief Complaint Patient presents with  
 Headache HISTORY OF PRESENT ILLNESS Kulwant Dickerson is a 75 y.o. female who came in for an evaluation regarding headaches. She states that she has had migraine headaches since she was a teenager. She continues to get them about 2-3 times per week. Sometimes they will last 1-2 days. Her typical headaches are unilateral, retro-orbital, sharp, throbbing pain associated with light sensitivity, nausea and vomiting. She does not typically take any medications. She tries to go into a dark room and uses a cold rag. Recently she has been noticing a different type of a headache which she describes as a dull pressure in the middle of her forehead and in the cheek region. She has also had drainage in the back of her throat. She is wondering if she could have sinus issues or inflammation. This headache is persistent, on a daily basis and is of a moderate intensity. Sometimes it fluctuates and becomes intense. No other associated focal motor or sensory deficits. Past Medical History:  
Diagnosis Date  GERD (gastroesophageal reflux disease)  Hypercholesterolemia  Migraine  Osteoarthritis   
 hand institue, Dr. Damon Emmanuel. Treatments: heat.  Other ill-defined conditions(759.89)   
 high cholesterol  TMJ (dislocation of temporomandibular joint) Current Outpatient Medications Medication Sig  
 methylPREDNISolone (MEDROL DOSEPACK) 4 mg tablet Take as directed  rizatriptan (MAXALT-MLT) 5 mg rapid dissolve tablet Take 1 Tab by mouth as needed for Migraine.  cholecalciferol, vitamin D3, (VITAMIN D3) 2,000 unit tab Take  by mouth. No current facility-administered medications for this visit. No Known Allergies Family History Problem Relation Age of Onset  Cancer Mother   
     breast  
 
Social History Tobacco Use  Smoking status: Never Smoker  Smokeless tobacco: Never Used Substance Use Topics  Alcohol use: No  
 Drug use: No  
 
Past Surgical History:  
Procedure Laterality Date  COLONOSCOPY N/A 12/12/2016 COLONOSCOPY performed by Sigifredo Garcia MD at [address] HX BLADDER SUSPENSION    
 HX CATARACT REMOVAL  2012  
 both eyes  HX GYN    
 hysterectomy  HX ORTHOPAEDIC    
 RT carpal tunnel surgery  HX ORTHOPAEDIC    
 LFT foot broken and tore tendons  HX OTHER SURGICAL  03/2012  
 sx for intestinal blockage [address]  HX UROLOGICAL    
 bladder suspension REVIEW OF SYSTEMS Review of Systems - History obtained from the patient Psychological ROS: negative ENT ROS: negative Hematological and Lymphatic ROS: negative Endocrine ROS: negative Respiratory ROS: no cough, shortness of breath, or wheezing Cardiovascular ROS: no chest pain or dyspnea on exertion Gastrointestinal ROS: no abdominal pain, change in bowel habits, or black or bloody stools Genito-Urinary ROS: no dysuria, trouble voiding, or hematuria Musculoskeletal ROS: negative Dermatological ROS: negative PHYSICAL EXAMINATION:   
Visit Vitals /80 Pulse 81 Resp 16 Ht 5' 7\" (1.702 m) Wt 76.7 kg (169 lb) SpO2 97% BMI 26.47 kg/m² General:  Well defined, nourished, and groomed individual in no acute distress. Neck: Supple, nontender, no bruits, no pain with resistance to active range of motion. Heart: Regular rate and rhythm, no murmurs, rub, or gallop. Normal S1S2. Lungs:  Clear to auscultation bilaterally with equal chest expansion, no cough, no wheeze Musculoskeletal:  Extremities revealed no edema and had full range of motion of joints. Psych:  Good mood and bright affect NEUROLOGICAL EXAMINATION:    
Mental Status:   Alert and oriented to person, place, and time with recent and remote memory intact. Attention span and concentration are normal. Speech is fluent with a full fund of knowledge. Cranial Nerves: II, III, IV, VI:  Visual acuity grossly intact. Visual fields are normal.   
Pupils are equal, round, and reactive to light and accommodation. Extra-ocular movements are full and fluid. V-XII: Hearing is grossly intact. Facial features are symmetric, with normal sensation and strength. The palate rises symmetrically and the tongue protrudes midline. Sternocleidomastoids 5/5. Motor Examination: Normal tone, bulk, and strength. 5/5 muscle strength throughout. No cogwheel rigidity or clonus present. Sensory exam:  Normal throughout to pinprick, temperature, and vibration sense. Normal proprioception. Coordination:  Finger to nose and rapid arm movement testing was normal.   No resting or intention tremor Gait and Station:  Steady while walking on toes, heels, and with tandem walking. Normal arm swing. No Rhomberg or pronator drift. No muscle wasting or fasiculations noted. Reflexes:  DTRs 2+ throughout. Toes downgoing. Elspeth Buckle / IMAGING Lab Results Component Value Date/Time WBC 5.1 11/08/2018 10:13 AM  
 HGB 14.2 11/08/2018 10:13 AM  
 HCT 42.2 11/08/2018 10:13 AM  
 PLATELET 130 55/87/8273 10:13 AM  
 MCV 91 11/08/2018 10:13 AM  
 
Lab Results Component Value Date/Time Sodium 144 11/08/2018 10:13 AM  
 Potassium 4.3 11/08/2018 10:13 AM  
 Chloride 108 (H) 11/08/2018 10:13 AM  
 CO2 25 11/08/2018 10:13 AM  
 Anion gap 7 03/06/2012 03:40 AM  
 Glucose 96 11/08/2018 10:13 AM  
 BUN 16 11/08/2018 10:13 AM  
 Creatinine 0.88 11/08/2018 10:13 AM  
 BUN/Creatinine ratio 18 11/08/2018 10:13 AM  
 GFR est AA 75 11/08/2018 10:13 AM  
 GFR est non-AA 65 11/08/2018 10:13 AM  
 Calcium 9.4 11/08/2018 10:13 AM  
 Bilirubin, total 0.4 11/08/2018 10:13 AM  
 AST (SGOT) 17 11/08/2018 10:13 AM  
 Alk.  phosphatase 104 11/08/2018 10:13 AM  
 Protein, total 6.0 11/08/2018 10:13 AM  
 Albumin 4.2 11/08/2018 10:13 AM  
 Globulin 3.3 03/05/2012 05:30 PM  
 A-G Ratio 2.3 (H) 11/08/2018 10:13 AM  
 ALT (SGPT) 9 11/08/2018 10:13 AM  
 
 
ASSESSMENT 
  ICD-10-CM ICD-9-CM 1. Chronic migraine w/o aura w/o status migrainosus, not intractable G43.709 346.70 methylPREDNISolone (MEDROL DOSEPACK) 4 mg tablet  
   rizatriptan (MAXALT-MLT) 5 mg rapid dissolve tablet 2. New daily persistent headache G44.52 339.42 CT HEAD WO CONT  
   SED RATE (ESR) C REACTIVE PROTEIN, QT  
   methylPREDNISolone (MEDROL DOSEPACK) 4 mg tablet DISCUSSION Ms. Monie Belcher has chronic migraine headaches. The treatment strategies for migraine headaches were reviewed including potential dietary and environmental triggers. She should try to avoid those. She should try low-dose Maxalt, dissolvable tablet as needed at the onset of her migraine and may combine it with an NSAID if needed. Low-dose injectable sumatriptan can also be considered due to associated vomiting. The side effect profile of this medication was reviewed. She does not have any significant vascular risk factors. Regarding the other, new type of headache, I have recommended ESR, C-reactive protein to rule out any inflammatory cause Check CT head to rule out a structural cause and it should also help evaluate sinuses Medrol Dosepak to break her current headache cycle Follow-up as needed Kaykay Charles MD 
Diplomate, American Board of Psychiatry & Neurology (Neurology) Amber [place] Board of Psychiatry & Neurology (Clinical Neurophysiology) Diplomate, [address] [place] [address] Board of Electrodiagnostic Medicine This note will not be viewable in [address]

## 2019-01-11 LAB
CRP SERPL-MCNC: <0.3 MG/L (ref 0–4.9)
ERYTHROCYTE [SEDIMENTATION RATE] IN BLOOD BY WESTERGREN METHOD: 5 MM/HR (ref 0–40)

## 2019-01-14 ENCOUNTER — TELEPHONE (OUTPATIENT)
Dept: NEUROLOGY | Age: 74
End: 2019-01-14

## 2019-01-25 ENCOUNTER — TELEPHONE (OUTPATIENT)
Dept: NEUROLOGY | Age: 74
End: 2019-01-25

## 2019-01-25 NOTE — TELEPHONE ENCOUNTER
Pt calling wanting to let us know about some new pain that happening around her neck - Please call back

## 2019-01-28 NOTE — TELEPHONE ENCOUNTER
Per Dr Gerardo Casas: We had ordered a scan but apparently it has been scheduled for February 4. Since this is a new symptom, she would need to be seen.  I can see her Monday but symptoms don't improve or worsen , she should go to ER. Spoke with patient who stated she was feeling a little better and would try to hold off on any other care until after her MRI appointment. I stated that should symptoms get worse, either call so we can see in office or go to ER. Patient verbalized understanding.

## 2019-02-04 ENCOUNTER — HOSPITAL ENCOUNTER (OUTPATIENT)
Dept: CT IMAGING | Age: 74
Discharge: HOME | End: 2019-02-04
Attending: PSYCHIATRY & NEUROLOGY
Payer: MEDICARE

## 2019-02-04 DIAGNOSIS — G44.52 NEW DAILY PERSISTENT HEADACHE: ICD-10-CM

## 2019-02-04 PROCEDURE — 70450 CT HEAD/BRAIN W/O DYE: CPT

## 2019-03-18 ENCOUNTER — HOSPITAL ENCOUNTER (OUTPATIENT)
Dept: LAB | Age: 74
Discharge: HOME | End: 2019-03-18
Payer: MEDICARE

## 2019-03-18 ENCOUNTER — OFFICE VISIT (OUTPATIENT)
Dept: FAMILY MEDICINE CLINIC | Age: 74
End: 2019-03-18

## 2019-03-18 VITALS
SYSTOLIC BLOOD PRESSURE: 109 MMHG | BODY MASS INDEX: 27.62 KG/M2 | RESPIRATION RATE: 18 BRPM | TEMPERATURE: 96.8 F | DIASTOLIC BLOOD PRESSURE: 66 MMHG | HEIGHT: 67 IN | WEIGHT: 176 LBS | OXYGEN SATURATION: 98 % | HEART RATE: 76 BPM

## 2019-03-18 DIAGNOSIS — Z23 ENCOUNTER FOR IMMUNIZATION: ICD-10-CM

## 2019-03-18 DIAGNOSIS — K59.00 CONSTIPATION, UNSPECIFIED CONSTIPATION TYPE: ICD-10-CM

## 2019-03-18 DIAGNOSIS — E66.3 OVERWEIGHT: ICD-10-CM

## 2019-03-18 DIAGNOSIS — E78.5 HYPERLIPIDEMIA, UNSPECIFIED HYPERLIPIDEMIA TYPE: ICD-10-CM

## 2019-03-18 DIAGNOSIS — Z13.820 OSTEOPOROSIS SCREENING: Primary | ICD-10-CM

## 2019-03-18 DIAGNOSIS — E55.9 VITAMIN D DEFICIENCY: ICD-10-CM

## 2019-03-18 DIAGNOSIS — K40.90 LEFT INGUINAL HERNIA: ICD-10-CM

## 2019-03-18 DIAGNOSIS — Z78.0 POSTMENOPAUSAL: ICD-10-CM

## 2019-03-18 PROCEDURE — 85025 COMPLETE CBC W/AUTO DIFF WBC: CPT

## 2019-03-18 PROCEDURE — 80053 COMPREHEN METABOLIC PANEL: CPT

## 2019-03-18 PROCEDURE — 36415 COLL VENOUS BLD VENIPUNCTURE: CPT

## 2019-03-18 PROCEDURE — 80061 LIPID PANEL: CPT

## 2019-03-18 PROCEDURE — 82306 VITAMIN D 25 HYDROXY: CPT

## 2019-03-18 NOTE — PROGRESS NOTES
HPI  Naima Dyer 75 y.o. female  presents to the office today for follow up. Blood pressure 109/66, pulse 76, temperature 96.8 °F (36 °C), temperature source Oral, resp. rate 18, height 5' 7\" (1.702 m), weight 176 lb (79.8 kg), SpO2 98 %. Body mass index is 27.57 kg/m². Osteoporosis Screening/Postmenopausal: Pt is due for DEXA scan. Provided pt with referral for imaging and she will adhere to orders. Constipation/Left Inguinal Hernia: Pt reports she followed up with surgeon to discuss surgery, but was not offered surgery. Pt notes she is not in a lot of pain, but she stays constipated. Pt notes she normally feels constipated when she has LLQ pain. She continues to drink prune juice to help move bowels naturally. Pt notes she has pencil thin caliber stool despite using stool softeners. Instructed pt to follow up with GI for an evaluation on the hernia to ensure no changes. She will also discuss pencil thin stool. Hyperlipidemia: Lipid panel on  notable for total cholesterol 11/8/18, , HDL 64, and triglycerides 65. Pt continues with aspirin 81 mg/d. Health Maintenance: Pt notes she is following up with hand specialist in [place] for right handed arthritis. Pt notes it is rheumatoid and she receives an injection periodically to manage the pain. Current Outpatient Medications   Medication Sig Dispense Refill    varicella-zoster recombinant, PF, (SHINGRIX, PF,) 50 mcg/0.5 mL susr injection 0.5 mL by IntraMUSCular route once for 1 dose. 2nd dose 2-3 months 0.5 mL 1    rizatriptan (MAXALT-MLT) 5 mg rapid dissolve tablet Take 1 Tab by mouth as needed for Migraine. 9 Tab 2    cholecalciferol, vitamin D3, (VITAMIN D3) 2,000 unit tab Take  by mouth. No Known Allergies  Past Medical History:   Diagnosis Date    GERD (gastroesophageal reflux disease)     Hypercholesterolemia     Migraine     Osteoarthritis     hand institue, Dr. Kristin Wise. Treatments: heat.      Other ill-defined conditions(799.89)     high cholesterol    TMJ (dislocation of temporomandibular joint)      Past Surgical History:   Procedure Laterality Date    COLONOSCOPY N/A 12/12/2016    COL[facility] at [facility]COPY    HX BLADDER SUSPENSION      HX CATARACT REMOVAL  2012    both eyes    HX GYN      hysterectomy    HX ORTHOPAEDIC      RT carpal tunnel surgery    HX ORTHOPAEDIC      LFT foot broken and tore tendons    HX OTHER SURGICAL  03/2012    sx for intestinal blockage    HX TOTAL ABDOMINAL HYSTERECTOMY  1975    HX UROLOGICAL      bladder suspension     Family History   Problem Relation Age of Onset    Cancer Mother         breast     Social History     Tobacco Use    Smoking status: Never Smoker    Smokeless tobacco: Never Used   Substance Use Topics    Alcohol use: No        Review of Systems   Constitutional: Negative for chills and fever. HENT: Negative for hearing loss and tinnitus. Eyes: Negative for blurred vision and double vision. Respiratory: Negative for shortness of breath. Cardiovascular: Negative for chest pain and palpitations. Gastrointestinal: Negative for nausea and vomiting. Genitourinary: Negative for dysuria and frequency. Musculoskeletal: Positive for joint pain. Negative for back pain and falls. Skin: Negative for itching and rash. Neurological: Negative for dizziness, loss of consciousness and headaches. Psychiatric/Behavioral: Negative for depression. The patient is not nervous/anxious. Physical Exam   Constitutional: She is oriented to person, place, and time. She appears well-developed and well-nourished. HENT:   Head: Normocephalic and atraumatic. Right Ear: External ear normal.   Left Ear: External ear normal.   Nose: Nose normal.   Mouth/Throat: Oropharynx is clear and moist.   Eyes: Conjunctivae and EOM are normal.   Neck: Normal range of motion. Neck supple. Carotid bruit is not present.  No thyroid mass and no thyromegaly present. Cardiovascular: Normal rate, regular rhythm, S1 normal, S2 normal, normal heart sounds and intact distal pulses. Pulmonary/Chest: Effort normal and breath sounds normal.   Abdominal: Soft. Normal appearance and bowel sounds are normal. There is no hepatosplenomegaly. There is no tenderness. Musculoskeletal: Normal range of motion. Neurological: She is alert and oriented to person, place, and time. She has normal strength. No cranial nerve deficit or sensory deficit. Coordination normal.   Skin: Skin is warm, dry and intact. No abrasion and no rash noted. Psychiatric: She has a normal mood and affect. Her behavior is normal. Judgment and thought content normal.   Nursing note and vitals reviewed. ASSESSMENT and PLAN  Diagnoses and all orders for this visit:    1. Osteoporosis screening  Provided pt with orders for DEXA imaging. She will adhere to order and follow up for imaging.   -     DEXA BONE DENSITY STUDY AXIAL; Future    2. Postmenopausal  Provided pt with orders for DEXA imaging. She will adhere to order and follow up for imaging.   -     DEXA BONE DENSITY STUDY AXIAL; Future  -     CBC WITH AUTOMATED DIFF    3. Encounter for immunization  Provided pt with prescription and will follow up with local pharmacy for Shingrix vaccine. -     varicella-zoster recombinant, PF, (SHINGRIX, PF,) 50 mcg/0.5 mL susr injection; 0.5 mL by IntraMUSCular route once for 1 dose. 2nd dose 2-3 months    4. Overweight  I have reviewed/discussed the above normal BMI with the patient. I have recommended the following interventions: dietary management education, guidance, and counseling, encourage exercise and monitor weight .   -     CBC WITH AUTOMATED DIFF    5. Vitamin D deficiency  Presumed stable, will assess levels today. -     VITAMIN D, 25 HYDROXY    6. Hyperlipidemia, unspecified hyperlipidemia type  Presumed stable, will assess levels today. Continue with aspirin 81 mg/d. -     METABOLIC PANEL, COMPREHENSIVE  -     LIPID PANEL    7. Left inguinal hernia   Instructed pt to follow up with GI for an evaluation on the hernia to ensure no changes. She will also discuss pencil thin stool. 8. Constipation, unspecified constipation type   Instructed pt to follow up with GI for an evaluation on the hernia to ensure no changes. She will also discuss pencil thin stool. Follow-up Disposition:  Return in about 6 months (around 9/18/2019). Medication risks/benefits/costs/interactions/alternatives discussed with patient. Advised patient to call back or return to office if symptoms worsen/change/persist.  If patient cannot reach us or should anything more severe/urgent arise he/she should proceed directly to the nearest emergency department. Discussed expected course/resolution/complications of diagnosis in detail with patient. Patient given a written after visit summary which includes her diagnoses, current medications and vitals. Patient expressed understanding with the diagnosis and plan. Written by shakeel Brown, as dictated by Bernadine Ribeiro M.D.    12:43 PM - 12:57 PM    Total time spent with the patient 14 minutes, greater than 50% of time spent counseling patient.

## 2019-03-18 NOTE — PATIENT INSTRUCTIONS
Body Mass Index: Care Instructions  Your Care Instructions    Body mass index (BMI) can help you see if your weight is raising your risk for health problems. It uses a formula to compare how much you weigh with how tall you are. · A BMI lower than 18.5 is considered underweight. · A BMI between 18.5 and 24.9 is considered healthy. · A BMI between 25 and 29.9 is considered overweight. A BMI of 30 or higher is considered obese. If your BMI is in the normal range, it means that you have a lower risk for weight-related health problems. If your BMI is in the overweight or obese range, you may be at increased risk for weight-related health problems, such as high blood pressure, heart disease, stroke, arthritis or joint pain, and diabetes. If your BMI is in the underweight range, you may be at increased risk for health problems such as fatigue, lower protection (immunity) against illness, muscle loss, bone loss, hair loss, and hormone problems. BMI is just one measure of your risk for weight-related health problems. You may be at higher risk for health problems if you are not active, you eat an unhealthy diet, or you drink too much alcohol or use tobacco products. Follow-up care is a key part of your treatment and safety. Be sure to make and go to all appointments, and call your doctor if you are having problems. It's also a good idea to know your test results and keep a list of the medicines you take. How can you care for yourself at home? · Practice healthy eating habits. This includes eating plenty of fruits, vegetables, whole grains, lean protein, and low-fat dairy. · If your doctor recommends it, get more exercise. Walking is a good choice. Bit by bit, increase the amount you walk every day. Try for at least 30 minutes on most days of the week. · Do not smoke. Smoking can increase your risk for health problems. If you need help quitting, talk to your doctor about stop-smoking programs and medicines. These can increase your chances of quitting for good. · Limit alcohol to 2 drinks a day for men and 1 drink a day for women. Too much alcohol can cause health problems. If you have a BMI higher than 25  · Your doctor may do other tests to check your risk for weight-related health problems. This may include measuring the distance around your waist. A waist measurement of more than 40 inches in men or 35 inches in women can increase the risk of weight-related health problems. · Talk with your doctor about steps you can take to stay healthy or improve your health. You may need to make lifestyle changes to lose weight and stay healthy, such as changing your diet and getting regular exercise. If you have a BMI lower than 18.5  · Your doctor may do other tests to check your risk for health problems. · Talk with your doctor about steps you can take to stay healthy or improve your health. You may need to make lifestyle changes to gain or maintain weight and stay healthy, such as getting more healthy foods in your diet and doing exercises to build muscle. Where can you learn more? Go to http://ness-lee.info/. Enter S176 in the search box to learn more about \"Body Mass Index: Care Instructions. \"  Current as of: June 25, 2018  Content Version: 11.9  © 6889-4578 [employer]. Care instructions adapted under license by [employer] (which disclaims liability or warranty for this information). If you have questions about a medical condition or this instruction, always ask your healthcare professional. [address] any warranty or liability for your use of this information.

## 2019-03-19 LAB
25(OH)D3+25(OH)D2 SERPL-MCNC: 19.8 NG/ML (ref 30–100)
ALBUMIN SERPL-MCNC: 4.5 G/DL (ref 3.5–4.8)
ALBUMIN/GLOB SERPL: 2.3 {RATIO} (ref 1.2–2.2)
ALP SERPL-CCNC: 115 IU/L (ref 39–117)
ALT SERPL-CCNC: 12 IU/L (ref 0–32)
AST SERPL-CCNC: 20 IU/L (ref 0–40)
BASOPHILS # BLD AUTO: 0 X10E3/UL (ref 0–0.2)
BASOPHILS NFR BLD AUTO: 1 %
BILIRUB SERPL-MCNC: 0.4 MG/DL (ref 0–1.2)
BUN SERPL-MCNC: 16 MG/DL (ref 8–27)
BUN/CREAT SERPL: 17 (ref 12–28)
CALCIUM SERPL-MCNC: 9.7 MG/DL (ref 8.7–10.3)
CHLORIDE SERPL-SCNC: 106 MMOL/L (ref 96–106)
CHOLEST SERPL-MCNC: 229 MG/DL (ref 100–199)
CO2 SERPL-SCNC: 21 MMOL/L (ref 20–29)
CREAT SERPL-MCNC: 0.95 MG/DL (ref 0.57–1)
EOSINOPHIL # BLD AUTO: 0.2 X10E3/UL (ref 0–0.4)
EOSINOPHIL NFR BLD AUTO: 3 %
ERYTHROCYTE [DISTWIDTH] IN BLOOD BY AUTOMATED COUNT: 13.4 % (ref 12.3–15.4)
GLOBULIN SER CALC-MCNC: 2 G/DL (ref 1.5–4.5)
GLUCOSE SERPL-MCNC: 86 MG/DL (ref 65–99)
HCT VFR BLD AUTO: 41.6 % (ref 34–46.6)
HDLC SERPL-MCNC: 71 MG/DL
HGB BLD-MCNC: 14.2 G/DL (ref 11.1–15.9)
IMM GRANULOCYTES # BLD AUTO: 0 X10E3/UL (ref 0–0.1)
IMM GRANULOCYTES NFR BLD AUTO: 0 %
INTERPRETATION, 910389: NORMAL
INTERPRETATION: NORMAL
LDLC SERPL CALC-MCNC: 144 MG/DL (ref 0–99)
LYMPHOCYTES # BLD AUTO: 2.3 X10E3/UL (ref 0.7–3.1)
LYMPHOCYTES NFR BLD AUTO: 35 %
MCH RBC QN AUTO: 30.5 PG (ref 26.6–33)
MCHC RBC AUTO-ENTMCNC: 34.1 G/DL (ref 31.5–35.7)
MCV RBC AUTO: 89 FL (ref 79–97)
MONOCYTES # BLD AUTO: 0.6 X10E3/UL (ref 0.1–0.9)
MONOCYTES NFR BLD AUTO: 9 %
NEUTROPHILS # BLD AUTO: 3.3 X10E3/UL (ref 1.4–7)
NEUTROPHILS NFR BLD AUTO: 52 %
PDF IMAGE, 910387: NORMAL
PLATELET # BLD AUTO: 209 X10E3/UL (ref 150–379)
POTASSIUM SERPL-SCNC: 4.4 MMOL/L (ref 3.5–5.2)
PROT SERPL-MCNC: 6.5 G/DL (ref 6–8.5)
RBC # BLD AUTO: 4.66 X10E6/UL (ref 3.77–5.28)
SODIUM SERPL-SCNC: 144 MMOL/L (ref 134–144)
TRIGL SERPL-MCNC: 72 MG/DL (ref 0–149)
VLDLC SERPL CALC-MCNC: 14 MG/DL (ref 5–40)
WBC # BLD AUTO: 6.4 X10E3/UL (ref 3.4–10.8)

## 2019-03-25 NOTE — PROGRESS NOTES
Inform pt to go to my chart to see results and recommendations    Your vitamin D levels are low. Please take vitamin D 4000 international units. ( available over the counter)  daily and get 15 minutes of sunlight. Please have vitamin D levels rechecked in 3 months. Lipids are slightly elevated, the LDL needs to be less than 130  Work on diet and exercise. Please be well hydrated.      Any questions let me know

## 2019-03-26 ENCOUNTER — HOSPITAL ENCOUNTER (OUTPATIENT)
Dept: MAMMOGRAPHY | Age: 74
Discharge: HOME | End: 2019-03-26
Attending: FAMILY MEDICINE
Payer: MEDICARE

## 2019-03-26 DIAGNOSIS — Z13.820 OSTEOPOROSIS SCREENING: ICD-10-CM

## 2019-03-26 DIAGNOSIS — Z78.0 POSTMENOPAUSAL: ICD-10-CM

## 2019-03-26 PROCEDURE — 77080 DXA BONE DENSITY AXIAL: CPT

## 2019-04-08 NOTE — PROGRESS NOTES
Viewed by Angela Moralez on 4/8/2019  9:52 AM   Patient aware via my chart and has appointment 4/10/2019 with Dr. Talia Neal

## 2019-04-22 ENCOUNTER — OFFICE VISIT (OUTPATIENT)
Dept: NEUROLOGY | Age: 74
End: 2019-04-22

## 2019-04-22 VITALS
WEIGHT: 170 LBS | BODY MASS INDEX: 26.68 KG/M2 | DIASTOLIC BLOOD PRESSURE: 80 MMHG | OXYGEN SATURATION: 98 % | HEART RATE: 79 BPM | SYSTOLIC BLOOD PRESSURE: 130 MMHG | RESPIRATION RATE: 18 BRPM | HEIGHT: 67 IN | TEMPERATURE: 97.9 F

## 2019-04-22 DIAGNOSIS — G43.709 CHRONIC MIGRAINE W/O AURA W/O STATUS MIGRAINOSUS, NOT INTRACTABLE: Primary | ICD-10-CM

## 2019-04-22 NOTE — PATIENT INSTRUCTIONS
A Healthy Lifestyle: Care Instructions Your Care Instructions A healthy lifestyle can help you feel good, stay at a healthy weight, and have plenty of energy for both work and play. A healthy lifestyle is something you can share with your whole family. A healthy lifestyle also can lower your risk for serious health problems, such as high blood pressure, heart disease, and diabetes. You can follow a few steps listed below to improve your health and the health of your family. Follow-up care is a key part of your treatment and safety. Be sure to make and go to all appointments, and call your doctor if you are having problems. It's also a good idea to know your test results and keep a list of the medicines you take. How can you care for yourself at home? · Do not eat too much sugar, fat, or fast foods. You can still have dessert and treats now and then. The goal is moderation. · Start small to improve your eating habits. Pay attention to portion sizes, drink less juice and soda pop, and eat more fruits and vegetables. ? Eat a healthy amount of food. A 3-ounce serving of meat, for example, is about the size of a deck of cards. Fill the rest of your plate with vegetables and whole grains. ? Limit the amount of soda and sports drinks you have every day. Drink more water when you are thirsty. ? Eat at least 5 servings of fruits and vegetables every day. It may seem like a lot, but it is not hard to reach this goal. A serving or helping is 1 piece of fruit, 1 cup of vegetables, or 2 cups of leafy, raw vegetables. Have an apple or some carrot sticks as an afternoon snack instead of a candy bar. Try to have fruits and/or vegetables at every meal. 
· Make exercise part of your daily routine. You may want to start with simple activities, such as walking, bicycling, or slow swimming. Try to be active 30 to 60 minutes every day.  You do not need to do all 30 to 60 minutes all at once. For example, you can exercise 3 times a day for 10 or 20 minutes. Moderate exercise is safe for most people, but it is always a good idea to talk to your doctor before starting an exercise program. 
· Keep moving. Sol Folk the lawn, work in the garden, or Jugo. Take the stairs instead of the elevator at work. · If you smoke, quit. People who smoke have an increased risk for heart attack, stroke, cancer, and other lung illnesses. Quitting is hard, but there are ways to boost your chance of quitting tobacco for good. ? Use nicotine gum, patches, or lozenges. ? Ask your doctor about stop-smoking programs and medicines. ? Keep trying. In addition to reducing your risk of diseases in the future, you will notice some benefits soon after you stop using tobacco. If you have shortness of breath or asthma symptoms, they will likely get better within a few weeks after you quit. · Limit how much alcohol you drink. Moderate amounts of alcohol (up to 2 drinks a day for men, 1 drink a day for women) are okay. But drinking too much can lead to liver problems, high blood pressure, and other health problems. Family health If you have a family, there are many things you can do together to improve your health. · Eat meals together as a family as often as possible. · Eat healthy foods. This includes fruits, vegetables, lean meats and dairy, and whole grains. · Include your family in your fitness plan. Most people think of activities such as jogging or tennis as the way to fitness, but there are many ways you and your family can be more active. Anything that makes you breathe hard and gets your heart pumping is exercise. Here are some tips: 
? Walk to do errands or to take your child to school or the bus. 
? Go for a family bike ride after dinner instead of watching TV. Where can you learn more? Go to http://ness-lee.info/. Enter Z239 in the search box to learn more about \"A Healthy Lifestyle: Care Instructions. \" Current as of: September 11, 2018 Content Version: 11.9 © 7422-9803 [employer]. Care instructions adapted under license by [employer] (which disclaims liability or warranty for this information). If you have questions about a medical condition or this instruction, always ask your healthcare professional. [place] [address] any warranty or liability for your use of this information. PRESCRIPTION REFILL POLICY [facility] Statement to Patients April 1, 2014 In an effort to ensure the large volume of patient prescription refills is processed in the most efficient and expeditious manner, we are asking our patients to assist us by calling your Pharmacy for all prescription refills, this will include also your  Mail Order Pharmacy. The pharmacy will contact our office electronically to continue the refill process. Please do not wait until the last minute to call your pharmacy. We need at least 48 hours (2days) to fill prescriptions. We also encourage you to call your pharmacy before going to  your prescription to make sure it is ready. With regard to controlled substance prescription refill requests (narcotic refills) that need to be picked up at our office, we ask your cooperation by providing us with at least 72 hours (3days) notice that you will need a refill. We will not refill narcotic prescription refill requests after 4:00pm on any weekday, Monday through Thursday, or after 2:00pm on Fridays, or on the weekends. We encourage everyone to explore another way of getting your prescription refill request processed using ZapMe, our patient web portal through our electronic medical record system.  LUVHANt is an efficient and effective way to communicate your medication request directly to the office and downloadable as an monika on your smart phone . Picarro also features a review functionality that allows you to view your medication list as well as leave messages for your physician. Are you ready to get connected? If so please review the attatched instructions or speak to any of our staff to get you set up right away! Thank you so much for your cooperation. Should you have any questions please contact our Practice Administrator. The Physicians and Staff,  [facility]

## 2019-04-22 NOTE — PROGRESS NOTES
Chief Complaint Patient presents with  
 Headache HISTORY OF PRESENT ILLNESS Scar Durbin came back for follow-up. She says that the daily headache that she was having resolved after she took a course of Medrol. She had CT brain and ESR, CRP checked and these were normal.  She was also having postnasal discharge which also cleared up. She may have had 1 or 2 occasional migraines and uses dissolvable rizatriptan which usually works well. RECAP She states that she has had migraine headaches since she was a teenager. She continues to get them about 2-3 times per week. Sometimes they will last 1-2 days. Her typical headaches are unilateral, retro-orbital, sharp, throbbing pain associated with light sensitivity, nausea and vomiting. She does not typically take any medications. She tries to go into a dark room and uses a cold rag. Current Outpatient Medications Medication Sig  
 rizatriptan (MAXALT-MLT) 5 mg rapid dissolve tablet Take 1 Tab by mouth as needed for Migraine.  cholecalciferol, vitamin D3, (VITAMIN D3) 2,000 unit tab Take  by mouth. No current facility-administered medications for this visit. PHYSICAL EXAMINATION:   
Visit Vitals /80 Pulse 79 Temp 97.9 °F (36.6 °C) Resp 18 Ht 5' 7\" (1.702 m) Wt 77.1 kg (170 lb) SpO2 98% BMI 26.63 kg/m² NEUROLOGICAL EXAMINATION:    
Mental Status:   Alert and oriented to person, place, and time . Attention span and concentration are normal. Speech is fluent. Cranial Nerves:   
II, III, IV, VI:  Visual acuity grossly intact. Visual fields are normal.   
Pupils are equal, round, and reactive. Extra-ocular movements are full and fluid. V-XII: Hearing is grossly intact. Facial features are symmetric, with normal sensation and strength. The palate rises symmetrically and the tongue protrudes midline. Motor Examination: Normal tone, bulk, and strength. Sensory exam:  Normal throughout Coordination:  No resting or intention tremor Gait and Station:  Steady. Normal arm swing. No muscle wasting or fasiculations noted. [facility] / IMAGING Lab Results Component Value Date/Time WBC 6.4 03/18/2019 01:06 PM  
 HGB 14.2 03/18/2019 01:06 PM  
 HCT 41.6 03/18/2019 01:06 PM  
 PLATELET 521 62/35/1073 01:06 PM  
 MCV 89 03/18/2019 01:06 PM  
 
Lab Results Component Value Date/Time Sodium 144 03/18/2019 01:06 PM  
 Potassium 4.4 03/18/2019 01:06 PM  
 Chloride 106 03/18/2019 01:06 PM  
 CO2 21 03/18/2019 01:06 PM  
 Anion gap 7 03/06/2012 03:40 AM  
 Glucose 86 03/18/2019 01:06 PM  
 BUN 16 03/18/2019 01:06 PM  
 Creatinine 0.95 03/18/2019 01:06 PM  
 BUN/Creatinine ratio 17 03/18/2019 01:06 PM  
 GFR est AA 68 03/18/2019 01:06 PM  
 GFR est non-AA 59 (L) 03/18/2019 01:06 PM  
 Calcium 9.7 03/18/2019 01:06 PM  
 Bilirubin, total 0.4 03/18/2019 01:06 PM  
 AST (SGOT) 20 03/18/2019 01:06 PM  
 Alk. phosphatase 115 03/18/2019 01:06 PM  
 Protein, total 6.5 03/18/2019 01:06 PM  
 Albumin 4.5 03/18/2019 01:06 PM  
 Globulin 3.3 03/05/2012 05:30 PM  
 A-G Ratio 2.3 (H) 03/18/2019 01:06 PM  
 ALT (SGPT) 12 03/18/2019 01:06 PM  
 
Lab Results Component Value Date/Time Sed rate (ESR) 5 01/10/2019 01:36 PM  
 
CT Results (most recent): 
Results from Hospital Encounter encounter on 02/04/19 CT HEAD WO CONT Narrative EXAM:  CT HEAD WO CONT INDICATION:   Daily HA 
 
COMPARISON: None. TECHNIQUE: Unenhanced CT of the head was performed using 5 mm images. Brain and 
bone windows were generated. CT dose reduction was achieved through use of a 
standardized protocol tailored for this examination and automatic exposure 
control for dose modulation. FINDINGS: 
The ventricles are normal in size and position. Basilar cisterns are patent. No 
midline shift. There is no evidence of acute infarct, hemorrhage, or extraaxial 
fluid collection. The paranasal sinuses, mastoid air cells, and middle ears are clear. The orbital 
contents are within normal limits with bilateral lens implants. There are no 
significant osseous or extracranial soft tissue lesions. Impression IMPRESSION: 
1. No evidence of acute intracranial abnormality. ASSESSMENT 
  ICD-10-CM ICD-9-CM 1. Chronic migraine w/o aura w/o status migrainosus, not intractable G43.709 346.70 DISCUSSION Ms. Tommy Kwong has chronic migraine headaches. She gets 1-2 migraines per week and uses an NSAID are dissolvable Maxalt which usually works She likely had allergic sinusitis which has cleared with the course of steroid She was reassured regarding negative brain imaging and lab work Follow-up as needed Corie Stephen MD 
Diplomate, American Board of Psychiatry & Neurology (Neurology) [facility] Board of Psychiatry & Neurology (Clinical Neurophysiology) Diplomate, [address] [place] [address] Board of Electrodiagnostic Medicine This note will not be viewable in [address]

## 2019-08-08 ENCOUNTER — HOSPITAL ENCOUNTER (OUTPATIENT)
Dept: LAB | Age: 74
Discharge: HOME | End: 2019-08-08
Payer: MEDICARE

## 2019-08-08 ENCOUNTER — OFFICE VISIT (OUTPATIENT)
Dept: FAMILY MEDICINE CLINIC | Age: 74
End: 2019-08-08

## 2019-08-08 ENCOUNTER — TELEPHONE (OUTPATIENT)
Dept: FAMILY MEDICINE CLINIC | Age: 74
End: 2019-08-08

## 2019-08-08 VITALS
HEART RATE: 72 BPM | HEIGHT: 67 IN | DIASTOLIC BLOOD PRESSURE: 70 MMHG | OXYGEN SATURATION: 97 % | TEMPERATURE: 97.9 F | WEIGHT: 172.8 LBS | SYSTOLIC BLOOD PRESSURE: 118 MMHG | BODY MASS INDEX: 27.12 KG/M2 | RESPIRATION RATE: 18 BRPM

## 2019-08-08 DIAGNOSIS — J02.9 ACUTE PHARYNGITIS, UNSPECIFIED ETIOLOGY: Primary | ICD-10-CM

## 2019-08-08 LAB
MONONUCLEOSIS SCREEN POC: NEGATIVE
VALID INTERNAL CONTROL?: YES

## 2019-08-08 PROCEDURE — 87081 CULTURE SCREEN ONLY: CPT

## 2019-08-08 NOTE — TELEPHONE ENCOUNTER
Spoke to patient transferred from [facility] for triage per patient no SOB just sore throat advised patient has appointment at 11:15 with NP Marianna advised if it gets worse go to ER

## 2019-08-08 NOTE — TELEPHONE ENCOUNTER
Patient is calling as a level one call stating that she is having severe throat pain. Pt has an appointment scheduled for today. Pt was transferred over to the nurse.       Best callback:  [phone]      LOV:  Monday, March 18, 2019

## 2019-08-08 NOTE — PROGRESS NOTES
Assessment/Plan:     Diagnoses and all orders for this visit:    1. Acute pharyngitis, unspecified etiology  -     POC HETEROPHILE ANTIBODY SCREEN  -     UPPER RESPIRATORY CULTURE  -     magic mouthwash solution; Magic mouth wash   Maalox  Lidocaine 2% viscous   Nystatin 100,000 units/ml    5ml swish and swallow three times daily. Pharmacy to mix equal portions of ingredients to a total volume as indicated in the dispense amount. Treatment as above. Etiology unclear. Culture pending. Treatment will be based on results. Continue with symptomatic treatment including fluids and rest.   Follow-up and Dispositions    · Return if symptoms worsen or fail to improve. Discussed expected course/resolution/complications of diagnosis in detail with patient. Medication risks/benefits/costs/interactions/alternatives discussed with patient. Pt was given after visit summary which includes diagnoses, current medications & vitals. Pt expressed understanding with the diagnosis and plan          Subjective:      Rupert Gómez is a 75 y.o. female who presents for had concerns including Hoarse (off and on for months). Sore Throat  Patient complains of sore throat. Associated symptoms include sore throat. Onset of symptoms was 2 months ago, gradually worsening since that time. She is drinking plenty of fluids. . She has not had recent close exposure to someone with proven streptococcal pharyngitis. Patient Active Problem List   Diagnosis Code    Other and unspecified hyperlipidemia E78.5    Osteoporosis M81.0    Vitamin D deficiency E55.9    S[facility]) ([facility] K56.609       Current Outpatient Medications   Medication Sig Dispense Refill    magic mouthwash solution Magic mouth wash   Maalox  Lidocaine 2% viscous   Nystatin 100,000 units/ml    5ml swish and swallow three times daily.      Pharmacy to mix equal portions of ingredients to a total volume as indicated in the dispense amount. 150 mL 0    cholecalciferol, vitamin D3, (VITAMIN D3) 2,000 unit tab Take  by mouth. No Known Allergies    ROS:   Review of Systems   Constitutional: Negative for chills, fever and malaise/fatigue. HENT: Positive for sore throat. Negative for congestion, ear pain and sinus pain. Respiratory: Negative for cough, sputum production, shortness of breath and wheezing. Cardiovascular: Negative for chest pain. Neurological: Negative for seizures. Endo/Heme/Allergies: Negative for environmental allergies. Objective:     Visit Vitals  /70   Pulse 72   Temp 97.9 °F (36.6 °C) (Oral)   Resp 18   Ht 5' 7\" (1.702 m)   Wt 172 lb 12.8 oz (78.4 kg)   SpO2 97%   BMI 27.06 kg/m²       Vitals and Nurse Documentation reviewed. Physical Exam   Constitutional: No distress. HENT:   Right Ear: Tympanic membrane is not erythematous and not bulging. No middle ear effusion. Left Ear: Tympanic membrane is not erythematous and not bulging. No middle ear effusion. Nose: No rhinorrhea. Right sinus exhibits no maxillary sinus tenderness and no frontal sinus tenderness. Left sinus exhibits no maxillary sinus tenderness and no frontal sinus tenderness. Mouth/Throat: No oropharyngeal exudate or posterior oropharyngeal erythema. Eyes: EOM and lids are normal.   Cardiovascular: S1 normal and S2 normal. Exam reveals no gallop and no friction rub. No murmur heard. Pulmonary/Chest: Breath sounds normal. She has no wheezes. Lymphadenopathy:     She has no cervical adenopathy. Skin: Skin is warm and dry.    Psychiatric: Mood and affect normal.

## 2019-08-08 NOTE — PROGRESS NOTES
Chief Complaint   Patient presents with   Dandre Enamorado     off and on for months     1. Have you been to the ER, urgent care clinic since your last visit? Hospitalized since your last visit? No    2. Have you seen or consulted any other health care providers outside of the [address] [place] [address] since your last visit? Include any pap smears or colon screening.  No

## 2019-08-11 LAB — S PYO THROAT QL CULT: NEGATIVE

## 2019-09-11 ENCOUNTER — OFFICE VISIT (OUTPATIENT)
Dept: FAMILY MEDICINE CLINIC | Age: 74
End: 2019-09-11

## 2019-09-11 ENCOUNTER — HOSPITAL ENCOUNTER (OUTPATIENT)
Dept: LAB | Age: 74
Discharge: HOME | End: 2019-09-11
Payer: MEDICARE

## 2019-09-11 VITALS
WEIGHT: 172.6 LBS | BODY MASS INDEX: 27.09 KG/M2 | TEMPERATURE: 98.6 F | SYSTOLIC BLOOD PRESSURE: 108 MMHG | OXYGEN SATURATION: 94 % | HEIGHT: 67 IN | RESPIRATION RATE: 18 BRPM | HEART RATE: 81 BPM | DIASTOLIC BLOOD PRESSURE: 67 MMHG

## 2019-09-11 DIAGNOSIS — J02.9 ACUTE PHARYNGITIS, UNSPECIFIED ETIOLOGY: ICD-10-CM

## 2019-09-11 DIAGNOSIS — M25.50 ARTHRALGIA, UNSPECIFIED JOINT: ICD-10-CM

## 2019-09-11 DIAGNOSIS — R49.0 HOARSE VOICE QUALITY: Primary | ICD-10-CM

## 2019-09-11 DIAGNOSIS — Z23 ENCOUNTER FOR IMMUNIZATION: ICD-10-CM

## 2019-09-11 DIAGNOSIS — K59.00 CONSTIPATION, UNSPECIFIED CONSTIPATION TYPE: ICD-10-CM

## 2019-09-11 DIAGNOSIS — R10.9 ABDOMINAL DISCOMFORT: ICD-10-CM

## 2019-09-11 DIAGNOSIS — Z13.220 SCREENING CHOLESTEROL LEVEL: ICD-10-CM

## 2019-09-11 PROCEDURE — 86431 RHEUMATOID FACTOR QUANT: CPT

## 2019-09-11 PROCEDURE — 84443 ASSAY THYROID STIM HORMONE: CPT

## 2019-09-11 PROCEDURE — 84439 ASSAY OF FREE THYROXINE: CPT

## 2019-09-11 PROCEDURE — 85025 COMPLETE CBC W/AUTO DIFF WBC: CPT

## 2019-09-11 PROCEDURE — 86140 C-REACTIVE PROTEIN: CPT

## 2019-09-11 PROCEDURE — 36415 COLL VENOUS BLD VENIPUNCTURE: CPT

## 2019-09-11 PROCEDURE — 80053 COMPREHEN METABOLIC PANEL: CPT

## 2019-09-11 PROCEDURE — 86200 CCP ANTIBODY: CPT

## 2019-09-11 PROCEDURE — 85651 RBC SED RATE NONAUTOMATED: CPT

## 2019-09-11 PROCEDURE — 80061 LIPID PANEL: CPT

## 2019-09-11 NOTE — PATIENT INSTRUCTIONS

## 2019-09-11 NOTE — PROGRESS NOTES
Chief Complaint   Patient presents with    Osteoporosis     follow up      1. Have you been to the ER, urgent care clinic since your last visit? Hospitalized since your last visit? No    2. Have you seen or consulted any other health care providers outside of the [address] [place] [address] since your last visit? Include any pap smears or colon screening.  No     Patient saw GI approximately 6 weeks ago

## 2019-09-11 NOTE — PROGRESS NOTES
History of Present Illness  Kailash Nicole is a 75 y.o. female who presents to the office today for follow up of routine medical issues. Sore throat: Pt reports that she has been having a sore throat and hoarse voice for several months. She was seen on 08/08/2019 in this office for this. [place] and throat culture were negative. She was prescribed magic mouthwash, which she used with some relief. She feels that her throat is somewhat sore at baseline and this worsens when she goes outdoors. She feels this may be allergic, but denies hx seasonal allergies. Abdominal pain: Pt reports that she is scheduled to f/u with GI soon. She reports that she now has 2/10 right sided abdominal pain which worsens when she is lying on her side. She endorses constipation with small, hard BMs. She has been taking miralax almost every day, which she feels makes her stools softer, but she still sometimes has 3-4 days between BMs. She has tried prune juice as well. Yesterday, she reports that she had a loose BM and denies pain currently. OA: Pt continues to see Dr. Baron Amos at [facility] for this. /67 (BP 1 Location: Right arm, BP Patient Position: Sitting)   Pulse 81   Temp 98.6 °F (37 °C) (Oral)   Resp 18   Ht 5' 7\" (1.702 m)   Wt 172 lb 9.6 oz (78.3 kg)   SpO2 94%   BMI 27.03 kg/m²     Current Outpatient Medications   Medication Sig Dispense Refill    magic mouthwash solution Magic mouth wash   Maalox  Lidocaine 2% viscous   Nystatin 100,000 units/ml    5ml swish and swallow three times daily. Pharmacy to mix equal portions of ingredients to a total volume as indicated in the dispense amount. 150 mL 0    cholecalciferol, vitamin D3, (VITAMIN D3) 2,000 unit tab Take  by mouth. No Known Allergies  Past Medical History:   Diagnosis Date    GERD (gastroesophageal reflux disease)     Hypercholesterolemia     Migraine     Osteoarthritis     [facility] Dr. Juancho Guillen. Treatments: heat.      Other ill-defined conditions(799.89)     high cholesterol    TMJ (dislocation of temporomandibular joint)      Past Surgical History:   Procedure Laterality Date    COLONOSCOPY N/A 12/12/2016    COLONOS[facility] performed [facility]ardo [facility] ENDOSCOPY    HX BLADDER SUSPENSION      HX CATARACT REMOVAL  2012    both eyes    HX GYN      hysterectomy    HX ORTHOPAEDIC      RT carpal tunnel surgery    HX ORTHOPAEDIC      LFT foot broken and tore tendons    HX OTHER SURGICAL  03/2012    sx for intestinal blockage    HX TOTAL ABDOMINAL HYSTERECTOMY  1975    HX UROLOGICAL      bladder suspension     Family History   Problem Relation Age of Onset    Cancer Mother         breast     Social History     Tobacco Use    Smoking status: Never Smoker    Smokeless tobacco: Never Used   Substance Use Topics    Alcohol use: No        Review of Systems   Constitutional: Negative for chills and fever. HENT: Positive for sore throat. Negative for hearing loss and tinnitus. Eyes: Negative for blurred vision and double vision. Respiratory: Negative for shortness of breath. Cardiovascular: Negative for chest pain and palpitations. Gastrointestinal: Positive for abdominal pain and constipation. Negative for blood in stool, diarrhea, nausea and vomiting. Genitourinary: Negative for dysuria and frequency. Musculoskeletal: Positive for joint pain. Negative for back pain and falls. Skin: Negative for itching and rash. Neurological: Negative for dizziness and headaches. Psychiatric/Behavioral: Negative for depression. The patient is not nervous/anxious. Physical Exam   Constitutional: She is oriented to person, place, and time and well-developed, well-nourished, and in no distress. HENT:   Head: Normocephalic and atraumatic. Right Ear: External ear normal.   Left Ear: External ear normal.   Nose: Nose normal.   Mouth/Throat: Uvula is midline and mucous membranes are normal. No oral lesions.  No trismus in the jaw. No uvula swelling. Posterior oropharyngeal erythema (mild, +hoarse voice) present. No oropharyngeal exudate, posterior oropharyngeal edema or tonsillar abscesses. Eyes: Conjunctivae and EOM are normal.   Neck: Normal range of motion. Neck supple. No tracheal tenderness present. Carotid bruit is not present. No tracheal deviation present. No thyroid mass present. Cardiovascular: Normal rate, regular rhythm, normal heart sounds and intact distal pulses. Pulmonary/Chest: Effort normal and breath sounds normal.   Abdominal: Soft. Bowel sounds are normal. She exhibits no distension and no mass. There is no hepatosplenomegaly. There is tenderness in the right lower quadrant. There is no rebound, no guarding and no CVA tenderness. Musculoskeletal: Normal range of motion. Lymphadenopathy:     She has no cervical adenopathy. Neurological: She is alert and oriented to person, place, and time. Skin: Skin is warm and dry. Psychiatric: Mood, affect and judgment normal.   Nursing note and vitals reviewed. Diagnoses and all orders for this visit:    1. Hoarse voice quality  -     REFERRAL TO ENT-OTOLARYNGOLOGY    2. Encounter for immunization  -     INFLUENZA VACCINE INACTIVATED (IIV), SUBUNIT, ADJUVANTED, IM  -     ADMIN INFLUENZA VIRUS VAC    3. Acute pharyngitis, unspecified etiology  -     magic mouthwash solution; Magic mouth wash   Maalox  Lidocaine 2% viscous   Nystatin 100,000 units/ml    5ml swish and swallow three times daily. Pharmacy to mix equal portions of ingredients to a total volume as indicated in the dispense amount. 4. Arthralgia, unspecified joint  -     RHEUMATOID FACTOR, QL  -     CYCLIC CITRUL PEPTIDE AB, IGG  -     SED RATE (ESR)  -     C REACTIVE PROTEIN, QT  -     CBC WITH AUTOMATED DIFF    5. Abdominal discomfort    6.  Constipation, unspecified constipation type  -     T4, FREE  -     TSH 3RD GENERATION    7. Screening cholesterol level  -     METABOLIC PANEL, COMPREHENSIVE  -     LIPID PANEL    1. Hoarse voice  Provided referral to Dr. Ceci Avila (ENT). 2. Encounter for immunization  Pt received influenza vaccine today in the office. 3. Acute pharyngitis  Refilled magic mouthwash. 4. Arthralgia  Check CBC, rheumatoid, ESR, CRP, CCP. 5. Abdominal pain  F/u with GI as scheduled. 6. Constipation  Recommended taking miralax 10 days on/10 days off. Recheck TSH and T4.    7. Screening cholesterol level  Recheck CMP and lipid panel. Medication risks/benefits/costs/interactions/alternatives discussed with patient. Advised patient to call back or return to office if symptoms worsen/change/persist.  If patient cannot reach us or should anything more severe/urgent arise she should proceed directly to the nearest emergency department. Discussed expected course/resolution/complications of diagnosis in detail with patient. Patient given a written after visit summary which includes her diagnoses, current medications and vitals. Patient expressed understanding with the diagnosis and plan. Written by Rolando Vasquez, as dictated by Ora Mclain M.D.     11:58 AM - 12:18 PM     Total time spent with the patient was 20 minutes, greater than 50% of time spent counseling patient.

## 2019-09-12 LAB
ALBUMIN SERPL-MCNC: 4.3 G/DL (ref 3.5–4.8)
ALBUMIN/GLOB SERPL: 2.4 {RATIO} (ref 1.2–2.2)
ALP SERPL-CCNC: 99 IU/L (ref 39–117)
ALT SERPL-CCNC: 12 IU/L (ref 0–32)
AST SERPL-CCNC: 19 IU/L (ref 0–40)
BASOPHILS # BLD AUTO: 0 X10E3/UL (ref 0–0.2)
BASOPHILS NFR BLD AUTO: 0 %
BILIRUB SERPL-MCNC: 0.7 MG/DL (ref 0–1.2)
BUN SERPL-MCNC: 13 MG/DL (ref 8–27)
BUN/CREAT SERPL: 14 (ref 12–28)
CALCIUM SERPL-MCNC: 9.5 MG/DL (ref 8.7–10.3)
CCP IGA+IGG SERPL IA-ACNC: 40 UNITS (ref 0–19)
CHLORIDE SERPL-SCNC: 105 MMOL/L (ref 96–106)
CHOLEST SERPL-MCNC: 226 MG/DL (ref 100–199)
CO2 SERPL-SCNC: 20 MMOL/L (ref 20–29)
CREAT SERPL-MCNC: 0.93 MG/DL (ref 0.57–1)
CRP SERPL-MCNC: 1 MG/L (ref 0–10)
EOSINOPHIL # BLD AUTO: 0.1 X10E3/UL (ref 0–0.4)
EOSINOPHIL NFR BLD AUTO: 2 %
ERYTHROCYTE [DISTWIDTH] IN BLOOD BY AUTOMATED COUNT: 13.9 % (ref 12.3–15.4)
ERYTHROCYTE [SEDIMENTATION RATE] IN BLOOD BY WESTERGREN METHOD: 13 MM/HR (ref 0–40)
GLOBULIN SER CALC-MCNC: 1.8 G/DL (ref 1.5–4.5)
GLUCOSE SERPL-MCNC: 94 MG/DL (ref 65–99)
HCT VFR BLD AUTO: 40.8 % (ref 34–46.6)
HDLC SERPL-MCNC: 59 MG/DL
HGB BLD-MCNC: 13.4 G/DL (ref 11.1–15.9)
IMM GRANULOCYTES # BLD AUTO: 0 X10E3/UL (ref 0–0.1)
IMM GRANULOCYTES NFR BLD AUTO: 0 %
INTERPRETATION, 910389: NORMAL
LDLC SERPL CALC-MCNC: 150 MG/DL (ref 0–99)
LYMPHOCYTES # BLD AUTO: 1.2 X10E3/UL (ref 0.7–3.1)
LYMPHOCYTES NFR BLD AUTO: 16 %
MCH RBC QN AUTO: 29.4 PG (ref 26.6–33)
MCHC RBC AUTO-ENTMCNC: 32.8 G/DL (ref 31.5–35.7)
MCV RBC AUTO: 90 FL (ref 79–97)
MONOCYTES # BLD AUTO: 0.7 X10E3/UL (ref 0.1–0.9)
MONOCYTES NFR BLD AUTO: 10 %
NEUTROPHILS # BLD AUTO: 5.3 X10E3/UL (ref 1.4–7)
NEUTROPHILS NFR BLD AUTO: 72 %
PLATELET # BLD AUTO: 187 X10E3/UL (ref 150–450)
POTASSIUM SERPL-SCNC: 4.2 MMOL/L (ref 3.5–5.2)
PROT SERPL-MCNC: 6.1 G/DL (ref 6–8.5)
RBC # BLD AUTO: 4.56 X10E6/UL (ref 3.77–5.28)
RHEUMATOID FACT SERPL-ACNC: <10 IU/ML (ref 0–13.9)
SODIUM SERPL-SCNC: 140 MMOL/L (ref 134–144)
T4 FREE SERPL-MCNC: 1.08 NG/DL (ref 0.82–1.77)
TRIGL SERPL-MCNC: 83 MG/DL (ref 0–149)
TSH SERPL DL<=0.005 MIU/L-ACNC: 2.07 UIU/ML (ref 0.45–4.5)
VLDLC SERPL CALC-MCNC: 17 MG/DL (ref 5–40)
WBC # BLD AUTO: 7.4 X10E3/UL (ref 3.4–10.8)

## 2019-09-16 NOTE — PROGRESS NOTES
[phone] ([place]) attempted to call patient no answer left message to call me back in regards to her lab results

## 2019-10-23 ENCOUNTER — HOSPITAL ENCOUNTER (OUTPATIENT)
Dept: GENERAL RADIOLOGY | Age: 74
Discharge: HOME | End: 2019-10-23
Attending: OTOLARYNGOLOGY
Payer: MEDICARE

## 2019-10-23 DIAGNOSIS — R13.10 DIFFICULTY IN SWALLOWING: ICD-10-CM

## 2019-10-23 PROCEDURE — 74220 X-RAY XM ESOPHAGUS 1CNTRST: CPT

## 2019-11-04 ENCOUNTER — HOSPITAL ENCOUNTER (OUTPATIENT)
Dept: CT IMAGING | Age: 74
Discharge: HOME | End: 2019-11-04
Attending: OTOLARYNGOLOGY
Payer: MEDICARE

## 2019-11-04 DIAGNOSIS — R22.1 NECK MASS: ICD-10-CM

## 2019-11-04 PROCEDURE — 74011636320 HC RX REV CODE- 636/320: Performed by: RADIOLOGY

## 2019-11-04 PROCEDURE — 70491 CT SOFT TISSUE NECK W/DYE: CPT

## 2019-11-04 RX ADMIN — IOPAMIDOL 100 ML: 612 INJECTION, SOLUTION INTRAVENOUS at 15:13

## 2019-12-11 ENCOUNTER — OFFICE VISIT (OUTPATIENT)
Dept: FAMILY MEDICINE CLINIC | Age: 74
End: 2019-12-11

## 2019-12-11 VITALS
SYSTOLIC BLOOD PRESSURE: 107 MMHG | TEMPERATURE: 97.4 F | RESPIRATION RATE: 18 BRPM | WEIGHT: 169 LBS | BODY MASS INDEX: 26.53 KG/M2 | HEIGHT: 67 IN | OXYGEN SATURATION: 96 % | DIASTOLIC BLOOD PRESSURE: 58 MMHG | HEART RATE: 82 BPM

## 2019-12-11 DIAGNOSIS — R07.0 THROAT DISCOMFORT: Primary | ICD-10-CM

## 2019-12-11 DIAGNOSIS — R19.5 ABNORMAL STOOL CALIBER: ICD-10-CM

## 2019-12-11 DIAGNOSIS — M25.541 ARTHRALGIA OF BOTH HANDS: ICD-10-CM

## 2019-12-11 DIAGNOSIS — M25.542 ARTHRALGIA OF BOTH HANDS: ICD-10-CM

## 2019-12-11 DIAGNOSIS — R49.0 HOARSE VOICE QUALITY: ICD-10-CM

## 2019-12-11 DIAGNOSIS — Z00.00 ENCOUNTER FOR MEDICARE ANNUAL WELLNESS EXAM: ICD-10-CM

## 2019-12-11 NOTE — PROGRESS NOTES
This is the Subsequent Medicare Annual Wellness Exam, performed 12 months or more after the Initial AWV or the last Subsequent AWV    I have reviewed the patient's medical history in detail and updated the computerized patient record. History     Patient Active Problem List   Diagnosis Code    Other and unspecified hyperlipidemia E78.5    Osteoporosis M81.0    Vitamin D deficiency E55.9    SBO (small bowel obstruction) ([facility] [address] K56.609     Past Medical History:   Diagnosis Date    GERD (gastroesophageal reflux disease)     Hypercholesterolemia     Migraine     Osteoarthritis     [facility], Dr. Maico Stern. Treatments: heat.  Other ill-defined conditions(799.89)     high cholesterol    TMJ (dislocation of temporomandibular joint)       Past Surgical History:   Procedure Laterality Date    COLONOSCOPY N/A 12/12/2016    COL[facility]rod [facility]COPY    HX BLADDER SUSPENSION      HX CATARACT REMOVAL  2012    both eyes    HX GYN      hysterectomy    HX ORTHOPAEDIC      RT carpal tunnel surgery    HX ORTHOPAEDIC      LFT foot broken and tore tendons    HX OTHER SURGICAL  03/2012    sx for intestinal blockage    HX TOTAL ABDOMINAL HYSTERECTOMY  1975    HX UROLOGICAL      bladder suspension     Current Outpatient Medications   Medication Sig Dispense Refill    cholecalciferol, vitamin D3, (VITAMIN D3) 2,000 unit tab Take  by mouth.        No Known Allergies    Family History   Problem Relation Age of Onset    Cancer Mother         breast     Social History     Tobacco Use    Smoking status: Never Smoker    Smokeless tobacco: Never Used   Substance Use Topics    Alcohol use: No       Depression Risk Factor Screening:     3 most recent PHQ Screens 12/11/2019   Little interest or pleasure in doing things Not at all   Feeling down, depressed, irritable, or hopeless Not at all   Total Score PHQ 2 0       Alcohol Risk Factor Screening:   Do you average 1 drink per night or more than 7 drinks a week:  No    On any one occasion in the past three months have you have had more than 3 drinks containing alcohol:  No      Functional Ability and Level of Safety:   Hearing: Hearing is good. Activities of Daily Living: The home contains: no safety equipment. Patient does total self care    Ambulation: with no difficulty    Fall Risk:  Fall Risk Assessment, last 12 mths 12/11/2019   Able to walk? Yes   Fall in past 12 months? No       Abuse Screen:  Patient is not abused    Cognitive Screening   Has your family/caregiver stated any concerns about your memory: no      Patient Care Team   Patient Care Team:  Anibal Lott MD as PCP - Juan David Milton MD as PCP - [facility] Empaneled Provider    Assessment/Plan   Education and counseling provided:  Are appropriate based on today's review and evaluation    Diagnoses and all orders for this visit:    1. Throat discomfort  -     REFERRAL TO GASTROENTEROLOGY    2. Abnormal stool caliber  -     REFERRAL TO GASTROENTEROLOGY    3. Hoarse voice quality    4.  Arthralgia of both hands        Health Maintenance Due   Topic Date Due    DTaP/Tdap/Td series (1 - Tdap) 10/14/1956    GLAUCOMA SCREENING Q2Y  03/24/2017    MEDICARE YEARLY EXAM  11/30/2019

## 2019-12-11 NOTE — PATIENT INSTRUCTIONS
Body Mass Index: Care Instructions Your Care Instructions Body mass index (BMI) can help you see if your weight is raising your risk for health problems. It uses a formula to compare how much you weigh with how tall you are. · A BMI lower than 18.5 is considered underweight. · A BMI between 18.5 and 24.9 is considered healthy. · A BMI between 25 and 29.9 is considered overweight. A BMI of 30 or higher is considered obese. If your BMI is in the normal range, it means that you have a lower risk for weight-related health problems. If your BMI is in the overweight or obese range, you may be at increased risk for weight-related health problems, such as high blood pressure, heart disease, stroke, arthritis or joint pain, and diabetes. If your BMI is in the underweight range, you may be at increased risk for health problems such as fatigue, lower protection (immunity) against illness, muscle loss, bone loss, hair loss, and hormone problems. BMI is just one measure of your risk for weight-related health problems. You may be at higher risk for health problems if you are not active, you eat an unhealthy diet, or you drink too much alcohol or use tobacco products. Follow-up care is a key part of your treatment and safety. Be sure to make and go to all appointments, and call your doctor if you are having problems. It's also a good idea to know your test results and keep a list of the medicines you take. How can you care for yourself at home? · Practice healthy eating habits. This includes eating plenty of fruits, vegetables, whole grains, lean protein, and low-fat dairy. · If your doctor recommends it, get more exercise. Walking is a good choice. Bit by bit, increase the amount you walk every day. Try for at least 30 minutes on most days of the week. · Do not smoke. Smoking can increase your risk for health problems.  If you need help quitting, talk to your doctor about stop-smoking programs and medicines. These can increase your chances of quitting for good. · Limit alcohol to 2 drinks a day for men and 1 drink a day for women. Too much alcohol can cause health problems. If you have a BMI higher than 25 · Your doctor may do other tests to check your risk for weight-related health problems. This may include measuring the distance around your waist. A waist measurement of more than 40 inches in men or 35 inches in women can increase the risk of weight-related health problems. · Talk with your doctor about steps you can take to stay healthy or improve your health. You may need to make lifestyle changes to lose weight and stay healthy, such as changing your diet and getting regular exercise. If you have a BMI lower than 18.5 · Your doctor may do other tests to check your risk for health problems. · Talk with your doctor about steps you can take to stay healthy or improve your health. You may need to make lifestyle changes to gain or maintain weight and stay healthy, such as getting more healthy foods in your diet and doing exercises to build muscle. Where can you learn more? Go to http://ness-lee.info/. Enter S176 in the search box to learn more about \"Body Mass Index: Care Instructions. \" Current as of: March 28, 2019 Content Version: 12.2 © 5044-7240 [employer]. Care instructions adapted under license by [employer] (which disclaims liability or warranty for this information). If you have questions about a medical condition or this instruction, always ask your healthcare professional. [address] any warranty or liability for your use of this information.

## 2019-12-11 NOTE — PROGRESS NOTES
HPI  Reva Dyer 75 y.o. female  presents to the office today for AWV. Blood pressure 107/58, pulse 82, temperature 97.4 °F (36.3 °C), temperature source Oral, resp. rate 18, height 5' 7\" (1.702 m), weight 169 lb (76.7 kg), SpO2 96 %. Body mass index is 26.47 kg/m². Chief Complaint   Patient presents with    Annual Wellness Visit    Hypertension    Weight Management        Pt reports arthritis pain in her hands, wrists and knees. But pt states that the pain is 1-2/10 and bearable. Pt has been seen by Dr. Zehra Beltran in [place] and Dr. Angle Miranda at [facility]. Pt received shot in the hands which initially helped. Pt is not currently taking any medication and states that she does not want to take anymore medications. Pt still complains of sore throat and hoarse voice which has not improved since last visit in Sep. Pt states that she has been coughing up yellow mucus x 6-8 days. Pt was seen by ENT but imaging studies (XR and CT) were unremarkable. Pt was told that she might have esophageal stricture (pt does not remember exactly what the ENT said). Pt notes that she does not have problem drinking fluids but admits difficulty swallowing sometimes. Pt reports still having problem with BM x several months. Pt endorses constipation and notes pencil caliber stools. Pt has tried prune juice but the condition has not improved. Pt admits to blood in stool but thinks that it's more due to hemorrhoid. After going to the bathroom, pt has urgency to go back. Pt states that sometimes she passes \"old, dark, bloody tissues\". Pt has hx of several miscarriage in the past. Pt was seen by NP. Current Outpatient Medications   Medication Sig Dispense Refill    cholecalciferol, vitamin D3, (VITAMIN D3) 2,000 unit tab Take  by mouth.        No Known Allergies  Past Medical History:   Diagnosis Date    GERD (gastroesophageal reflux disease)     Hypercholesterolemia     Migraine     Osteoarthritis     [facility], Dr. Metro Osler. Treatments: heat.  Other ill-defined conditions(799.89)     high cholesterol    TMJ (dislocation of temporomandibular joint)      Past Surgical History:   Procedure Laterality Date    COLONOSCOPY N/A 12/12/2016    COL[facility]COPY performed by Elena Shelby MD at [facility] ENDOSCOPY    HX BLADDER SUSPENSION      HX CATARACT REMOVAL  2012    both eyes    HX GYN      hysterectomy    HX ORTHOPAEDIC      RT carpal tunnel surgery    HX ORTHOPAEDIC      LFT foot broken and tore tendons    HX OTHER SURGICAL  03/2012    sx for intestinal blockage    HX TOTAL ABDOMINAL HYSTERECTOMY  1975    HX UROLOGICAL      bladder suspension     Family History   Problem Relation Age of Onset    Cancer Mother         breast     Social History     Tobacco Use    Smoking status: Never Smoker    Smokeless tobacco: Never Used   Substance Use Topics    Alcohol use: No        Review of Systems   Constitutional: Negative for chills and fever. HENT: Positive for sore throat. Negative for hearing loss and tinnitus. Eyes: Negative for blurred vision and double vision. Respiratory: Negative for cough and shortness of breath. Cardiovascular: Negative for chest pain and palpitations. Gastrointestinal: Positive for blood in stool and constipation. Negative for nausea and vomiting. Genitourinary: Positive for urgency. Negative for dysuria and frequency. Musculoskeletal: Positive for joint pain. Negative for back pain and falls. Skin: Negative for itching and rash. Neurological: Negative for dizziness, loss of consciousness and headaches. Psychiatric/Behavioral: Negative for depression. The patient is not nervous/anxious. Physical Exam  Vitals signs and nursing note reviewed. Constitutional:       Appearance: Normal appearance. She is well-developed. HENT:      Head: Normocephalic and atraumatic.       Right Ear: External ear normal.      Left Ear: External ear normal.      Nose: Nose normal.   Eyes:      Conjunctiva/sclera: Conjunctivae normal.      Pupils: Pupils are equal, round, and reactive to light. Neck:      Musculoskeletal: Normal range of motion and neck supple. Cardiovascular:      Rate and Rhythm: Normal rate and regular rhythm. Pulses: Normal pulses. Heart sounds: Normal heart sounds. Pulmonary:      Effort: Pulmonary effort is normal.      Breath sounds: Normal breath sounds. Abdominal:      General: Bowel sounds are normal.      Palpations: Abdomen is soft. Musculoskeletal: Normal range of motion. Skin:     General: Skin is warm and dry. Neurological:      Mental Status: She is alert and oriented to person, place, and time. Psychiatric:         Speech: Speech normal.         Behavior: Behavior normal.         Thought Content: Thought content normal.         Judgment: Judgment normal.           ASSESSMENT and PLAN  Diagnoses and all orders for this visit:    1. Throat discomfort  Discussed with pt that since ENT could not determine etiology, I suspected that it could be GI-related. Provided pt with referral to GI Dr. Iliana King for evaluation.   -     REFERRAL TO GASTROENTEROLOGY    2. Abnormal stool caliber  Pt should also discuss this with GI Dr. Iliana King for evaluation.   -     REFERRAL TO GASTROENTEROLOGY    3. Hoarse voice quality  Discussed with pt that since ENT could not determine etiology, I suspected that it could be GI-related. Provided pt with referral to GI Dr. Iliana King for evaluation. 4. Arthralgia of both hands  Suspected of rheumatoid arthritis. Pt is under care of specialist.     Follow-up and Dispositions    · Return in about 6 weeks (around 1/22/2020) for weight check. Medication risks/benefits/costs/interactions/alternatives discussed with patient.   Advised patient to call back or return to office if symptoms worsen/change/persist.  If patient cannot reach us or should anything more severe/urgent arise he/she should proceed directly to the nearest emergency department. Discussed expected course/resolution/complications of diagnosis in detail with patient. Patient given a written after visit summary which includes her diagnoses, current medications and vitals. Patient expressed understanding with the diagnosis and plan. Written by shakeel Barclay, as dictated by Kylie Treviño M.D.    11:12 AM - 11:30 AM    Total time spent with the patient 18 minutes, greater than 50% of time spent counseling patient.

## 2019-12-11 NOTE — PROGRESS NOTES
Chief Complaint   Patient presents with    Annual Wellness Visit    Hypertension    Weight Management       Reviewed Record in preparation for visit and have obtained necessary documentation. Identified pt with two pt identifiers (Name @ )    Health Maintenance Due   Topic    DTaP/Tdap/Td series (1 - Tdap)    GLAUCOMA SCREENING Q2Y     MEDICARE YEARLY EXAM          1. Have you been to the ER, urgent care clinic since your last visit? Hospitalized since your last visit?no      2. Have you seen or consulted any other health care providers outside of the [address] [place] [address] since your last visit? Include any pap smears or colon screening.  No

## 2020-10-10 ENCOUNTER — HOSPITAL ENCOUNTER (EMERGENCY)
Age: 75
Discharge: HOME | End: 2020-10-10
Attending: EMERGENCY MEDICINE
Payer: MEDICARE

## 2020-10-10 ENCOUNTER — APPOINTMENT (OUTPATIENT)
Dept: GENERAL RADIOLOGY | Age: 75
End: 2020-10-10
Attending: EMERGENCY MEDICINE
Payer: MEDICARE

## 2020-10-10 VITALS
DIASTOLIC BLOOD PRESSURE: 55 MMHG | WEIGHT: 165 LBS | HEIGHT: 68 IN | BODY MASS INDEX: 25.01 KG/M2 | HEART RATE: 84 BPM | OXYGEN SATURATION: 95 % | RESPIRATION RATE: 20 BRPM | TEMPERATURE: 97.5 F | SYSTOLIC BLOOD PRESSURE: 128 MMHG

## 2020-10-10 DIAGNOSIS — S69.92XA LEFT WRIST INJURY, INITIAL ENCOUNTER: Primary | ICD-10-CM

## 2020-10-10 DIAGNOSIS — S62.102A LEFT WRIST FRACTURE, CLOSED, INITIAL ENCOUNTER: ICD-10-CM

## 2020-10-10 PROCEDURE — 74011250637 HC RX REV CODE- 250/637: Performed by: EMERGENCY MEDICINE

## 2020-10-10 PROCEDURE — 73110 X-RAY EXAM OF WRIST: CPT

## 2020-10-10 PROCEDURE — 75810000301 HC ER LEVEL 1 CLOSED TREATMNT FRACTURE/DISLOCATION

## 2020-10-10 PROCEDURE — 99284 EMERGENCY DEPT VISIT MOD MDM: CPT

## 2020-10-10 PROCEDURE — 74011000250 HC RX REV CODE- 250: Performed by: EMERGENCY MEDICINE

## 2020-10-10 RX ORDER — OMEPRAZOLE 20 MG/1
1 CAPSULE, DELAYED RELEASE ORAL DAILY
COMMUNITY
Start: 2020-02-26

## 2020-10-10 RX ORDER — NAPROXEN 500 MG/1
500 TABLET ORAL 2 TIMES DAILY WITH MEALS
Qty: 20 TAB | Refills: 0 | Status: SHIPPED | OUTPATIENT
Start: 2020-10-10

## 2020-10-10 RX ORDER — HYDROCODONE BITARTRATE AND ACETAMINOPHEN 5; 325 MG/1; MG/1
1 TABLET ORAL
Qty: 15 TAB | Refills: 0 | Status: SHIPPED | OUTPATIENT
Start: 2020-10-10 | End: 2020-10-15

## 2020-10-10 RX ORDER — BUPIVACAINE HYDROCHLORIDE 5 MG/ML
5 INJECTION, SOLUTION EPIDURAL; INTRACAUDAL ONCE
Status: COMPLETED | OUTPATIENT
Start: 2020-10-10 | End: 2020-10-10

## 2020-10-10 RX ORDER — HYDROCODONE BITARTRATE AND ACETAMINOPHEN 5; 325 MG/1; MG/1
1 TABLET ORAL
Status: COMPLETED | OUTPATIENT
Start: 2020-10-10 | End: 2020-10-10

## 2020-10-10 RX ADMIN — BUPIVACAINE HYDROCHLORIDE 25 MG: 5 INJECTION, SOLUTION EPIDURAL; INTRACAUDAL at 19:31

## 2020-10-10 RX ADMIN — HYDROCODONE BITARTRATE AND ACETAMINOPHEN 1 TABLET: 5; 325 TABLET ORAL at 19:30

## 2020-10-10 NOTE — ED TRIAGE NOTES
Pt reports slipping today around 5:20pm and I hit the hard metal of the  with my left wrist/arm. Pt reports she has been icing the area but denies any medication for the pain. Patient arrives to ED with ice pack to left arm.

## 2020-10-10 NOTE — ED NOTES
Medicated patient per order. Gosia at bedside for Dr. Martine Espinosa. Patient and family updated on plan of care. Verbalized understanding.  Call bell within reach

## 2020-10-10 NOTE — ED PROVIDER NOTES
The patient is a 43-year-old female with a past medical history significant for GERD, hypercholesterolemia, migraine headaches, osteoarthritis, TMJ, hypercholesterolemia who presents to the ED with  at the bedside and states that she tripped, lost her balance, fell and landed on her left side and buttock. She is complaining of left upper extremity and wrist pain. The patient is right-handed. The patient denies any headache, neck pain, back pain, chest pain or shortness of breath, nausea, vomiting, abdominal pain, dizziness, extremity weakness or numbness, loss of consciousness, sick contact, recent travel, prior history of the same. The patient also denies any history of anticoagulant use. Past Medical History:   Diagnosis Date    GERD (gastroesophageal reflux disease)     Hypercholesterolemia     Migraine     Osteoarthritis     [facility], Dr. Shira Millan. Treatments: heat.      Other ill-defined conditions(799.89)     high cholesterol    TMJ (dislocation of temporomandibular joint)        Past Surgical History:   Procedure Laterality Date    COLONOSCOPY N/A 12/12/2016    COL[facility] Giuliano [facility] at [facility]OSCOPY    HX BLADDER SUSPENSION      HX CATARACT REMOVAL  2012    both eyes    HX GYN      hysterectomy    HX ORTHOPAEDIC      RT carpal tunnel surgery    HX ORTHOPAEDIC      LFT foot broken and tore tendons    HX OTHER SURGICAL  03/2012    sx for intestinal blockage    HX TOTAL ABDOMINAL HYSTERECTOMY  1975    HX UROLOGICAL      bladder suspension         Family History:   Problem Relation Age of Onset    Cancer Mother         breast       Social History     Socioeconomic History    Marital status:      Spouse name: Not on file    Number of children: Not on file    Years of education: Not on file    Highest education level: Not on file   Occupational History    Not on file   Social Needs    Financial resource strain: Not on file   Marika-Linda insecurity     Worry: Not on file     Inability: Not on file    Transportation needs     Medical: Not on file     Non-medical: Not on file   Tobacco Use    Smoking status: Never Smoker    Smokeless tobacco: Never Used   Substance and Sexual Activity    Alcohol use: No    Drug use: No    Sexual activity: Never   Lifestyle    Physical activity     Days per week: Not on file     Minutes per session: Not on file    Stress: Not on file   Relationships    Social connections     Talks on phone: Not on file     Gets together: Not on file     Attends Hindu service: Not on file     Active member of club or organization: Not on file     Attends meetings of clubs or organizations: Not on file     Relationship status: Not on file    Intimate partner violence     Fear of current or ex partner: Not on file     Emotionally abused: Not on file     Physically abused: Not on file     Forced sexual activity: Not on file   Other Topics Concern     Service No    Blood Transfusions No    Caffeine Concern No    Occupational Exposure No    Hobby Hazards No    Sleep Concern Yes    Stress Concern Yes    Weight Concern No    Special Diet No    Back Care Yes    Exercise Yes    Bike Helmet Not Asked    Seat Belt Yes    Self-Exams Yes   Social History Narrative    Not on file         ALLERGIES: Patient has no known allergies. Review of Systems   All other systems reviewed and are negative. Vitals:    10/10/20 1905   BP: (!) 147/85   Pulse: 87   Resp: 18   Temp: 97.5 °F (36.4 °C)   SpO2: 98%   Weight: 74.8 kg (165 lb)   Height: 5' 8\" (1.727 m)            Physical Exam  Vitals signs and nursing note reviewed. Exam conducted with a chaperone present. Musculoskeletal:      Left wrist: She exhibits decreased range of motion, tenderness, bony tenderness and swelling. She exhibits no effusion, no crepitus, no deformity and no laceration.         Arms:        CONSTITUTIONAL: Well-appearing; well-nourished; in no apparent distress  HEAD: Normocephalic; atraumatic  EYES: PERRL; EOM intact; conjunctiva and sclera are clear bilaterally. ENT: No rhinorrhea; normal pharynx with no tonsillar hypertrophy; mucous membranes pink/moist, no erythema, no exudate. NECK: Supple; non-tender; no cervical lymphadenopathy  CARD: Normal S1, S2; no murmurs, rubs, or gallops. Regular rate and rhythm. RESP: Normal respiratory effort; breath sounds clear and equal bilaterally; no wheezes, rhonchi, or rales. ABD: Normal bowel sounds; non-distended; non-tender; no palpable organomegaly, no masses, no bruits. Back Exam: Normal inspection; no vertebral point tenderness, no CVA tenderness. Normal range of motion. SKIN: Warm; dry; no rash. NEURO:Alert and oriented x 3, coherent, CIRO-XII grossly intact, sensory and motor are non-focal.         MDM  Number of Diagnoses or Management Options  Diagnosis management comments: Assessment: This is a 26-year-old female who presents to the ED status post fall with left wrist pain and injury. The patient has very low risk for ICH and skull fracture. She is awake alert, in no apparent distress and ambulates without difficulty. Has no focal neurologic findings on exam.    Plan: Education, reassurance, symptomatic treatment/x-ray of the left wrist/analgesia/splint and sling/ Monitor and Reevaluate.          Amount and/or Complexity of Data Reviewed  Clinical lab tests: ordered and reviewed  Tests in the radiology section of CPT®: ordered  Tests in the medicine section of CPT®: reviewed and ordered  Discussion of test results with the performing providers: yes  Decide to obtain previous medical records or to obtain history from someone other than the patient: yes  Obtain history from someone other than the patient: yes  Review and summarize past medical records: yes  Discuss the patient with other providers: yes  Independent visualization of images, tracings, or specimens: yes    Risk of Complications, Morbidity, and/or Mortality  Presenting problems: moderate  Diagnostic procedures: moderate  Management options: moderate    Patient Progress  Patient progress: stable         Reduction of Joint    Date/Time: 10/10/2020 8:58 PM  Performed by: Keysha Schaffer MD  Authorized by: Keysha Schaffer MD     Consent:     Consent obtained:  Verbal    Consent given by:  Patient    Risks discussed:  Pain    Alternatives discussed:  No treatment  Injury:     Injury location:  Wrist    Wrist injury location:  L wrist    Wrist fracture type comment:  Slightly angulated but nondisplaced left left distal radius fracture  Pre-procedure assessment:     Neurological function: normal      Distal perfusion: normal      Range of motion: reduced    Anesthesia (see MAR for exact dosages): Anesthesia method:  Local infiltration    Local anesthetic:  Bupivacaine 0.5% WITH epi  Procedure details:     Manipulation performed: yes      Skeletal traction used: yes      Pin inserted: no      Reduction successful: yes      X-ray confirmed reduction: no      Immobilization:  Sling and splint    Splint type:  Sugar tong    Supplies used:  Ortho-Glass  Post-procedure assessment:     Neurological function: normal      Distal perfusion: normal      Range of motion: normal      Patient tolerance of procedure: Tolerated well, no immediate complications        XRAY INTERPRETATION (ED MD)  Xray of Left wrist shows nondisplaced distal radial fracture with soft tissue swelling. Keysha Schaffer MD 7:22 PM    Sling and Splint applied by Cyrilla Lanes, MD.  Neurovascular status intact post splint application. Progress Note:   Pt has been reexamined by Cyrilla Lanes, MD. Pt is feeling much better. Symptoms have improved. All available results have been reviewed with pt and any available family. Pt understands sx, dx, and tx in ED. Care plan has been outlined and questions have been answered. Pt is ready to go home. Will send home on wrist fracture instruction. Sling and splint care education. Prescription of naproxen and Norco.. Outpatient referral with Ortho in 3 days for reevaluation and further treatment as needed. Written by Yamil Benoit MD,8:57 PM    .   .

## 2020-10-11 NOTE — DISCHARGE INSTRUCTIONS
Patient Education        Broken Wrist: Care Instructions  Your Care Instructions     Your wrist can break, or fracture, during sports, a fall, or other accidents. The break may happen when your wrist is hit or is used to protect you in a fall. Fractures can range from a small, hairline crack, to a bone or bones broken into two or more pieces. Your treatment depends on how bad the break is. Your doctor may have put your wrist in a cast or splint. This will help keep your wrist stable until your follow-up appointment. It may take weeks or months for your wrist to heal. You can help it heal with care at home. You heal best when you take good care of yourself. Eat a variety of healthy foods, and don't smoke. Follow-up care is a key part of your treatment and safety. Be sure to make and go to all appointments, and call your doctor if you are having problems. It's also a good idea to know your test results and keep a list of the medicines you take. How can you care for yourself at home? · Put ice or a cold pack on your wrist for 10 to 20 minutes at a time. Try to do this every 1 to 2 hours for the next 3 days (when you are awake). Put a thin cloth between the ice and your cast or splint. Keep your cast or splint dry. · Follow the splint or cast care instructions your doctor gives you. If you have a splint, do not take it off unless your doctor tells you to. Be careful not to put the splint on too tight. · Be safe with medicines. Take pain medicines exactly as directed. ? If the doctor gave you a prescription medicine for pain, take it as prescribed. ? If you are not taking a prescription pain medicine, ask your doctor if you can take an over-the-counter medicine. · Prop up your wrist on pillows when you sit or lie down in the first few days after the injury. Keep your wrist higher than the level of your heart. This will help reduce swelling.   · Move your fingers often to reduce swelling and stiffness, but do not use that hand to grab or carry anything. · Follow instructions for exercises to keep your arm strong. When should you call for help? Call your doctor now or seek immediate medical care if:    · You have new or worse pain.     · Your hand or fingers are cool or pale or change color.     · Your cast or splint feels too tight.     · You have tingling, weakness, or numbness in your hand or fingers. Watch closely for changes in your health, and be sure to contact your doctor if:    · You do not get better as expected.     · You have problems with your cast or splint. Where can you learn more? Go to http://www.gray.com/  Enter J579 in the search box to learn more about \"Broken Wrist: Care Instructions. \"  Current as of: March 2, 2020               Content Version: 12.6  © 2559-8517 [employer]. Care instructions adapted under license by [employer] (which disclaims liability or warranty for this information). If you have questions about a medical condition or this instruction, always ask your healthcare professional. [address] any warranty or liability for your use of this information.

## 2020-10-11 NOTE — ED NOTES
Discharge note: The patient was discharged home in stable condition, accompanied by family member. The patient is alert and oriented, is in no respiratory distress. The patient's diagnosis, condition and treatment were explained to patient by Dr Sagar Tony and reinforced by nurse. The patient expressed understanding of discharge instructions, prescriptions, and plan of care. A discharge plan has been developed. A  was not involved in the process. Patient offered a wheelchair to ED lobby for discharge but declined at this time. Patient ambulatory to ED lobby to go home with family member.

## 2020-10-13 ENCOUNTER — OFFICE VISIT (OUTPATIENT)
Dept: FAMILY MEDICINE CLINIC | Age: 75
End: 2020-10-13
Payer: MEDICARE

## 2020-10-13 ENCOUNTER — HOSPITAL ENCOUNTER (OUTPATIENT)
Dept: LAB | Age: 75
Discharge: HOME | End: 2020-10-13
Payer: MEDICARE

## 2020-10-13 VITALS
WEIGHT: 173 LBS | DIASTOLIC BLOOD PRESSURE: 61 MMHG | TEMPERATURE: 98.1 F | RESPIRATION RATE: 18 BRPM | OXYGEN SATURATION: 97 % | SYSTOLIC BLOOD PRESSURE: 92 MMHG | HEIGHT: 68 IN | BODY MASS INDEX: 26.22 KG/M2 | HEART RATE: 79 BPM

## 2020-10-13 DIAGNOSIS — Z01.818 PREOP EXAM FOR INTERNAL MEDICINE: Primary | ICD-10-CM

## 2020-10-13 DIAGNOSIS — E55.9 VITAMIN D DEFICIENCY: ICD-10-CM

## 2020-10-13 DIAGNOSIS — E78.5 HYPERLIPIDEMIA, UNSPECIFIED HYPERLIPIDEMIA TYPE: ICD-10-CM

## 2020-10-13 DIAGNOSIS — M80.00XA AGE-RELATED OSTEOPOROSIS WITH CURRENT PATHOLOGICAL FRACTURE, INITIAL ENCOUNTER: ICD-10-CM

## 2020-10-13 PROCEDURE — 80053 COMPREHEN METABOLIC PANEL: CPT

## 2020-10-13 PROCEDURE — 80061 LIPID PANEL: CPT

## 2020-10-13 PROCEDURE — 85025 COMPLETE CBC W/AUTO DIFF WBC: CPT

## 2020-10-13 PROCEDURE — 82306 VITAMIN D 25 HYDROXY: CPT

## 2020-10-13 PROCEDURE — 3017F COLORECTAL CA SCREEN DOC REV: CPT | Performed by: NURSE PRACTITIONER

## 2020-10-13 PROCEDURE — G8427 DOCREV CUR MEDS BY ELIG CLIN: HCPCS | Performed by: NURSE PRACTITIONER

## 2020-10-13 PROCEDURE — G8432 DEP SCR NOT DOC, RNG: HCPCS | Performed by: NURSE PRACTITIONER

## 2020-10-13 PROCEDURE — 99214 OFFICE O/P EST MOD 30 MIN: CPT | Performed by: NURSE PRACTITIONER

## 2020-10-13 PROCEDURE — G8536 NO DOC ELDER MAL SCRN: HCPCS | Performed by: NURSE PRACTITIONER

## 2020-10-13 PROCEDURE — 1101F PT FALLS ASSESS-DOCD LE1/YR: CPT | Performed by: NURSE PRACTITIONER

## 2020-10-13 PROCEDURE — 84443 ASSAY THYROID STIM HORMONE: CPT

## 2020-10-13 PROCEDURE — 1090F PRES/ABSN URINE INCON ASSESS: CPT | Performed by: NURSE PRACTITIONER

## 2020-10-13 PROCEDURE — G8419 CALC BMI OUT NRM PARAM NOF/U: HCPCS | Performed by: NURSE PRACTITIONER

## 2020-10-13 PROCEDURE — G0463 HOSPITAL OUTPT CLINIC VISIT: HCPCS | Performed by: NURSE PRACTITIONER

## 2020-10-13 RX ORDER — ACETAMINOPHEN 500 MG
TABLET ORAL
COMMUNITY

## 2020-10-13 NOTE — PROGRESS NOTES
Preoperative Evaluation    Date of Exam: 10/13/2020    Luis Antonio Arroyo is a 75 y.o. female (:1945) who presents for preoperative evaluation. Procedure/Surgery: ORIF of left radius  Date of Procedure/Surgery: 10/20/20  Surgeon: Dr. Rainer Cannon: Trinity Blackwood  Primary Physician: Janelle Julian MD  Latex Allergy: no    Medical History:     Past Medical History:   Diagnosis Date    GERD (gastroesophageal reflux disease)     Hypercholesterolemia     Migraine     Osteoarthritis     [facility], Dr. Rell Solomon. Treatments: heat.  Other ill-defined conditions(799.89)     high cholesterol    TMJ (dislocation of temporomandibular joint)      Allergies:   No Known Allergies    Medications:     Current Outpatient Medications   Medication Sig    acetaminophen (Tylenol Extra Strength) 500 mg tablet Take  by mouth every six (6) hours as needed for Pain.  linaCLOtide (Linzess) 72 mcg cap capsule Take 1 Cap by mouth daily.  omeprazole (PRILOSEC) 20 mg capsule Take 1 Cap by mouth daily.  HYDROcodone-acetaminophen (Norco) 5-325 mg per tablet Take 1 Tab by mouth every eight (8) hours as needed for Pain for up to 5 days. Max Daily Amount: 3 Tabs.  naproxen (NAPROSYN) 500 mg tablet Take 1 Tab by mouth two (2) times daily (with meals). No current facility-administered medications for this visit.       Surgical History:     Past Surgical History:   Procedure Laterality Date    COLONOSCOPY N/A 2016    COLONOSCOPY performed by Jose Barbosa MD at [address] HX BLADDER SUSPENSION      HX CATARACT REMOVAL      both eyes    HX GYN      hysterectomy    HX ORTHOPAEDIC      RT carpal tunnel surgery    HX ORTHOPAEDIC      LFT foot broken and tore tendons    HX OTHER SURGICAL  2012    sx for intestinal blockage    HX TOTAL ABDOMINAL HYSTERECTOMY      HX UROLOGICAL      bladder suspension     Social History:     Socioeconomic History    Marital status:    Tobacco Use    Smoking status: Never Smoker    Smokeless tobacco: Never Used   Substance and Sexual Activity    Alcohol use: No    Drug use: No     Anesthesia Complications: None  History of abnormal bleeding : None  History of Blood Transfusions: no  Health Care Directive or Living Will: no    REVIEW OF SYSTEMS:  A comprehensive review of systems was negative. Physical Exam  Visit Vitals  BP 92/61 (BP 1 Location: Right arm, BP Patient Position: Sitting)   Pulse 79   Temp 98.1 °F (36.7 °C) (Temporal)   Resp 18   Ht 5' 8\" (1.727 m)   Wt 173 lb (78.5 kg)   SpO2 97%   BMI 26.30 kg/m²     General appearance: alert, well appearing, and in no distress. Chest: clear to auscultation, no wheezes, rales or rhonchi, symmetric air entry. CVS exam: normal rate, regular rhythm, normal S1, S2, no murmurs, rubs, clicks or gallops, normal bilateral carotid upstroke without bruits, no JVD. Exam of extremities: peripheral pulses normal, no pedal edema, no clubbing or cyanosis      IMPRESSION:   Based on the above evaluation, the patient is stable and cleared for surgery.         Smitha Cummings NP   10/13/2020

## 2020-10-13 NOTE — PATIENT INSTRUCTIONS
Learning About Vitamin D Why is it important to get enough vitamin D? Your body needs vitamin D to absorb calcium. Calcium keeps your bones and muscles, including your heart, healthy and strong. If your muscles don't get enough calcium, they can cramp, hurt, or feel weak. You may have long-term (chronic) muscle aches and pains. If you don't get enough vitamin D throughout life, you have an increased chance of having thin and brittle bones (osteoporosis) in your later years. Children who don't get enough vitamin D may not grow as much as others their age. They also have a chance of getting a rare disease called rickets. It causes weak bones. Vitamin D and calcium are added to many foods. And your body uses sunshine to make its own vitamin D. How much vitamin D do you need? The [place] [employer] recommends that people ages 3 through 79 get 600 IU (international units) every day. Adults 71 and older need 800 IU every day. Blood tests for vitamin D can check your vitamin D level. But there is no standard normal range used by all laboratories. You're likely getting enough vitamin D if your levels are in the range of 20 to 50 ng/mL. How can you get more vitamin D? Foods that contain vitamin D include: 
· [place], tuna, and mackerel. These are some of the best foods to eat when you need to get more vitamin D. 
· Cheese, egg yolks, and beef liver. These foods have vitamin D in small amounts. · Milk, soy drinks, orange juice, yogurt, margarine, and some kinds of cereal have vitamin D added to them. Some people don't make vitamin D as well as others. They may have to take extra care in getting enough vitamin D. Things that reduce how much vitamin D your body makes include: · Dark skin, such as many  Americans have. · Age, especially if you are older than 72. · Digestive problems, such as Crohn's or celiac disease. · Liver and kidney disease. Some people who do not get enough vitamin D may need supplements. Are there any risks from taking vitamin D? 
· Too much vitamin D: 
? Can damage your kidneys. ? Can cause nausea and vomiting, constipation, and weakness. ? Raises the amount of calcium in your blood. If this happens, you can get confused or have an irregular heart rhythm. · Vitamin D may interact with other medicines. Tell your doctor about all of the medicines you take, including over-the-counter drugs, herbs, and pills. Tell your doctor about all of your current medical problems. Where can you learn more? Go to http://www.C2FO/ Enter [id number] in the search box to learn more about \"Learning About Vitamin D.\" 
Current as of: August 22, 2019               Content Version: 12.6 © 9291-1118 [employer]. Care instructions adapted under license by [employer] (which disclaims liability or warranty for this information). If you have questions about a medical condition or this instruction, always ask your healthcare professional. [address] any warranty or liability for your use of this information.

## 2020-10-13 NOTE — PROGRESS NOTES
Chief Complaint   Patient presents with    Pre-op Exam     1. Have you been to the ER, urgent care clinic since your last visit? Hospitalized since your last visit? Yes When: 10/10/2020 [facility]: [facility] Reason for visit: fall    2. Have you seen or consulted any other health care providers outside of the [address] [place] [address] since your last visit? Include any pap smears or colon screening. When: 10/12/2020, DR. Browning, for ortho visit

## 2020-10-14 LAB
25(OH)D3+25(OH)D2 SERPL-MCNC: 25.8 NG/ML (ref 30–100)
ALBUMIN SERPL-MCNC: 4.2 G/DL (ref 3.7–4.7)
ALBUMIN/GLOB SERPL: 2.5 {RATIO} (ref 1.2–2.2)
ALP SERPL-CCNC: 120 IU/L (ref 39–117)
ALT SERPL-CCNC: 10 IU/L (ref 0–32)
AST SERPL-CCNC: 19 IU/L (ref 0–40)
BASOPHILS # BLD AUTO: 0 X10E3/UL (ref 0–0.2)
BASOPHILS NFR BLD AUTO: 0 %
BILIRUB SERPL-MCNC: 0.4 MG/DL (ref 0–1.2)
BUN SERPL-MCNC: 15 MG/DL (ref 8–27)
BUN/CREAT SERPL: 15 (ref 12–28)
CALCIUM SERPL-MCNC: 9.6 MG/DL (ref 8.7–10.3)
CHLORIDE SERPL-SCNC: 108 MMOL/L (ref 96–106)
CHOLEST SERPL-MCNC: 209 MG/DL (ref 100–199)
CO2 SERPL-SCNC: 23 MMOL/L (ref 20–29)
CREAT SERPL-MCNC: 0.98 MG/DL (ref 0.57–1)
EOSINOPHIL # BLD AUTO: 0.2 X10E3/UL (ref 0–0.4)
EOSINOPHIL NFR BLD AUTO: 3 %
ERYTHROCYTE [DISTWIDTH] IN BLOOD BY AUTOMATED COUNT: 12.8 % (ref 11.7–15.4)
GLOBULIN SER CALC-MCNC: 1.7 G/DL (ref 1.5–4.5)
GLUCOSE SERPL-MCNC: 81 MG/DL (ref 65–99)
HCT VFR BLD AUTO: 39.8 % (ref 34–46.6)
HDLC SERPL-MCNC: 53 MG/DL
HGB BLD-MCNC: 13.4 G/DL (ref 11.1–15.9)
IMM GRANULOCYTES # BLD AUTO: 0 X10E3/UL (ref 0–0.1)
IMM GRANULOCYTES NFR BLD AUTO: 0 %
INTERPRETATION, 910389: NORMAL
INTERPRETATION: NORMAL
LDLC SERPL CALC-MCNC: 142 MG/DL (ref 0–99)
LYMPHOCYTES # BLD AUTO: 1.7 X10E3/UL (ref 0.7–3.1)
LYMPHOCYTES NFR BLD AUTO: 26 %
MCH RBC QN AUTO: 30.5 PG (ref 26.6–33)
MCHC RBC AUTO-ENTMCNC: 33.7 G/DL (ref 31.5–35.7)
MCV RBC AUTO: 91 FL (ref 79–97)
MONOCYTES # BLD AUTO: 0.7 X10E3/UL (ref 0.1–0.9)
MONOCYTES NFR BLD AUTO: 10 %
NEUTROPHILS # BLD AUTO: 4.1 X10E3/UL (ref 1.4–7)
NEUTROPHILS NFR BLD AUTO: 61 %
PDF IMAGE, 910387: NORMAL
PLATELET # BLD AUTO: 216 X10E3/UL (ref 150–450)
POTASSIUM SERPL-SCNC: 4.8 MMOL/L (ref 3.5–5.2)
PROT SERPL-MCNC: 5.9 G/DL (ref 6–8.5)
RBC # BLD AUTO: 4.4 X10E6/UL (ref 3.77–5.28)
SODIUM SERPL-SCNC: 142 MMOL/L (ref 134–144)
T4 FREE SERPL-MCNC: 1.23 NG/DL (ref 0.82–1.77)
TRIGL SERPL-MCNC: 78 MG/DL (ref 0–149)
TSH SERPL DL<=0.005 MIU/L-ACNC: 2.69 UIU/ML (ref 0.45–4.5)
VLDLC SERPL CALC-MCNC: 14 MG/DL (ref 5–40)
WBC # BLD AUTO: 6.7 X10E3/UL (ref 3.4–10.8)

## 2020-10-15 ENCOUNTER — DOCUMENTATION ONLY (OUTPATIENT)
Dept: FAMILY MEDICINE CLINIC | Age: 75
End: 2020-10-15

## 2020-10-15 NOTE — PROGRESS NOTES
Pre op forms completed by MARQUISE Anaya faxed to [address] [place] [address] at [phone]. Received confirmation of fax.

## 2021-10-21 NOTE — CALL BACK NOTE
New/updated order required for patient's upcoming [facility] appointment. Faxed doctor's office on 10/21/21 at 12:49 PM.  Refer to fax documents in pharmacy for further information.

## 2021-10-28 ENCOUNTER — HOSPITAL ENCOUNTER (OUTPATIENT)
Dept: INFUSION THERAPY | Age: 76
Discharge: HOME | End: 2021-10-28

## 2022-04-28 ENCOUNTER — APPOINTMENT (OUTPATIENT)
Dept: INFUSION THERAPY | Age: 77
End: 2022-04-28

## 2022-10-28 ENCOUNTER — APPOINTMENT (OUTPATIENT)
Dept: INFUSION THERAPY | Age: 77
End: 2022-10-28

## 2023-09-14 ENCOUNTER — APPOINTMENT (OUTPATIENT)
Facility: HOSPITAL | Age: 78
End: 2023-09-14
Payer: MEDICARE

## 2023-09-14 ENCOUNTER — HOSPITAL ENCOUNTER (EMERGENCY)
Facility: HOSPITAL | Age: 78
Discharge: HOME | End: 2023-09-14
Attending: EMERGENCY MEDICINE
Payer: MEDICARE

## 2023-09-14 VITALS
SYSTOLIC BLOOD PRESSURE: 117 MMHG | RESPIRATION RATE: 20 BRPM | DIASTOLIC BLOOD PRESSURE: 69 MMHG | OXYGEN SATURATION: 97 % | TEMPERATURE: 98.1 F | HEART RATE: 83 BPM | WEIGHT: 147.71 LBS | HEIGHT: 67 IN | BODY MASS INDEX: 23.18 KG/M2

## 2023-09-14 DIAGNOSIS — M54.2 NECK PAIN: Primary | ICD-10-CM

## 2023-09-14 LAB
ALBUMIN SERPL-MCNC: 3.4 G/DL (ref 3.5–5)
ALBUMIN/GLOB SERPL: 1.1 (ref 1.1–2.2)
ALP SERPL-CCNC: 107 U/L (ref 45–117)
ALT SERPL-CCNC: 18 U/L (ref 12–78)
ANION GAP SERPL CALC-SCNC: 7 MMOL/L (ref 5–15)
AST SERPL-CCNC: 16 U/L (ref 15–37)
BASOPHILS # BLD: 0 K/UL (ref 0–0.1)
BASOPHILS NFR BLD: 1 % (ref 0–1)
BILIRUB SERPL-MCNC: 0.8 MG/DL (ref 0.2–1)
BUN SERPL-MCNC: 18 MG/DL (ref 6–20)
BUN/CREAT SERPL: 17 (ref 12–20)
CALCIUM SERPL-MCNC: 8.8 MG/DL (ref 8.5–10.1)
CHLORIDE SERPL-SCNC: 109 MMOL/L (ref 97–108)
CO2 SERPL-SCNC: 27 MMOL/L (ref 21–32)
CREAT SERPL-MCNC: 1.04 MG/DL (ref 0.55–1.02)
DIFFERENTIAL METHOD BLD: NORMAL
EOSINOPHIL # BLD: 0.2 K/UL (ref 0–0.4)
EOSINOPHIL NFR BLD: 3 % (ref 0–7)
ERYTHROCYTE [DISTWIDTH] IN BLOOD BY AUTOMATED COUNT: 13.3 % (ref 11.5–14.5)
ERYTHROCYTE [SEDIMENTATION RATE] IN BLOOD: 10 MM/HR (ref 0–30)
GLOBULIN SER CALC-MCNC: 3.2 G/DL (ref 2–4)
GLUCOSE SERPL-MCNC: 125 MG/DL (ref 65–100)
HCT VFR BLD AUTO: 38.4 % (ref 35–47)
HGB BLD-MCNC: 13 G/DL (ref 11.5–16)
IMM GRANULOCYTES # BLD AUTO: 0 K/UL (ref 0–0.04)
IMM GRANULOCYTES NFR BLD AUTO: 0 % (ref 0–0.5)
LYMPHOCYTES # BLD: 1.3 K/UL (ref 0.8–3.5)
LYMPHOCYTES NFR BLD: 20 % (ref 12–49)
MAGNESIUM SERPL-MCNC: 1.8 MG/DL (ref 1.6–2.4)
MCH RBC QN AUTO: 30.3 PG (ref 26–34)
MCHC RBC AUTO-ENTMCNC: 33.9 G/DL (ref 30–36.5)
MCV RBC AUTO: 89.5 FL (ref 80–99)
MONOCYTES # BLD: 0.7 K/UL (ref 0–1)
MONOCYTES NFR BLD: 12 % (ref 5–13)
NEUTS SEG # BLD: 4.1 K/UL (ref 1.8–8)
NEUTS SEG NFR BLD: 64 % (ref 32–75)
NRBC # BLD: 0 K/UL (ref 0–0.01)
NRBC BLD-RTO: 0 PER 100 WBC
PLATELET # BLD AUTO: 170 K/UL (ref 150–400)
PMV BLD AUTO: 9 FL (ref 8.9–12.9)
POTASSIUM SERPL-SCNC: 3.8 MMOL/L (ref 3.5–5.1)
PROT SERPL-MCNC: 6.6 G/DL (ref 6.4–8.2)
RBC # BLD AUTO: 4.29 M/UL (ref 3.8–5.2)
SODIUM SERPL-SCNC: 143 MMOL/L (ref 136–145)
WBC # BLD AUTO: 6.3 K/UL (ref 3.6–11)

## 2023-09-14 PROCEDURE — 85025 COMPLETE CBC W/AUTO DIFF WBC: CPT

## 2023-09-14 PROCEDURE — 70450 CT HEAD/BRAIN W/O DYE: CPT

## 2023-09-14 PROCEDURE — 85652 RBC SED RATE AUTOMATED: CPT

## 2023-09-14 PROCEDURE — 80053 COMPREHEN METABOLIC PANEL: CPT

## 2023-09-14 PROCEDURE — 72125 CT NECK SPINE W/O DYE: CPT

## 2023-09-14 PROCEDURE — 96374 THER/PROPH/DIAG INJ IV PUSH: CPT

## 2023-09-14 PROCEDURE — 6370000000 HC RX 637 (ALT 250 FOR IP): Performed by: EMERGENCY MEDICINE

## 2023-09-14 PROCEDURE — 6360000002 HC RX W HCPCS: Performed by: EMERGENCY MEDICINE

## 2023-09-14 PROCEDURE — 99284 EMERGENCY DEPT VISIT MOD MDM: CPT

## 2023-09-14 PROCEDURE — 36415 COLL VENOUS BLD VENIPUNCTURE: CPT

## 2023-09-14 PROCEDURE — 83735 ASSAY OF MAGNESIUM: CPT

## 2023-09-14 RX ORDER — CYCLOBENZAPRINE HCL 10 MG
10 TABLET ORAL 3 TIMES DAILY PRN
Qty: 15 TABLET | Refills: 0 | Status: SHIPPED | OUTPATIENT
Start: 2023-09-14 | End: 2023-09-24

## 2023-09-14 RX ORDER — KETOROLAC TROMETHAMINE 30 MG/ML
30 INJECTION, SOLUTION INTRAMUSCULAR; INTRAVENOUS ONCE
Status: DISCONTINUED | OUTPATIENT
Start: 2023-09-14 | End: 2023-09-14

## 2023-09-14 RX ORDER — HYDROCODONE BITARTRATE AND ACETAMINOPHEN 5; 325 MG/1; MG/1
1 TABLET ORAL EVERY 4 HOURS PRN
Qty: 12 TABLET | Refills: 0 | Status: SHIPPED | OUTPATIENT
Start: 2023-09-14 | End: 2023-09-17

## 2023-09-14 RX ORDER — KETOROLAC TROMETHAMINE 10 MG/1
10 TABLET, FILM COATED ORAL EVERY 6 HOURS PRN
Qty: 20 TABLET | Refills: 0 | Status: SHIPPED | OUTPATIENT
Start: 2023-09-14 | End: 2023-09-24

## 2023-09-14 RX ORDER — IBUPROFEN 200 MG
200 TABLET ORAL EVERY 6 HOURS PRN
COMMUNITY

## 2023-09-14 RX ORDER — VIBEGRON 75 MG/1
TABLET, FILM COATED ORAL
COMMUNITY
Start: 2023-07-19

## 2023-09-14 RX ORDER — CYCLOBENZAPRINE HCL 10 MG
10 TABLET ORAL
Status: COMPLETED | OUTPATIENT
Start: 2023-09-14 | End: 2023-09-14

## 2023-09-14 RX ORDER — KETOROLAC TROMETHAMINE 30 MG/ML
30 INJECTION, SOLUTION INTRAMUSCULAR; INTRAVENOUS ONCE
Status: COMPLETED | OUTPATIENT
Start: 2023-09-14 | End: 2023-09-14

## 2023-09-14 RX ORDER — METHYLPREDNISOLONE 4 MG/1
TABLET ORAL
Qty: 1 KIT | Refills: 0 | Status: SHIPPED | OUTPATIENT
Start: 2023-09-14

## 2023-09-14 RX ADMIN — CYCLOBENZAPRINE 10 MG: 10 TABLET, FILM COATED ORAL at 09:43

## 2023-09-14 RX ADMIN — KETOROLAC TROMETHAMINE 30 MG: 30 INJECTION, SOLUTION INTRAMUSCULAR; INTRAVENOUS at 09:47

## 2023-09-14 ASSESSMENT — PAIN SCALES - GENERAL: PAINLEVEL_OUTOF10: 10

## 2023-09-14 ASSESSMENT — PAIN DESCRIPTION - LOCATION: LOCATION: NECK

## 2023-09-14 ASSESSMENT — PAIN DESCRIPTION - ORIENTATION: ORIENTATION: RIGHT

## 2023-09-14 ASSESSMENT — PAIN DESCRIPTION - DESCRIPTORS: DESCRIPTORS: SHARP

## 2023-09-14 ASSESSMENT — LIFESTYLE VARIABLES: HOW OFTEN DO YOU HAVE A DRINK CONTAINING ALCOHOL: PATIENT DECLINED

## 2023-09-14 NOTE — ED PROVIDER NOTES
[facility] ENCOUNTER      Patient Name: Mary Desai  MRN: [id number]  [address] [birth year]  Date of Evaluation: 9/14/2023  Physician: Bertha Miner MD    CHIEF COMPLAINT       Chief Complaint   Patient presents with    Neck Pain    Headache       HISTORY OF PRESENT ILLNESS   (Location/Symptom, Timing/Onset, Context/Setting, Quality, Duration, Modifying Factors, Severity)   Mary Desai, 65 y.o., female     65-year-old female presents with a chief complaint of neck pain. Patient reports she has had several months of neck pain which she has been using Motrin and warm compresses for. She states that she tried to lay down last night and could not lift her head up to get up and go to the bathroom. The pain is worse with rotation of the neck. She denies numbness, tingling or weakness in her arms or legs. She has not had any dizziness or vision changes. Nursing Notes were reviewed. REVIEW OF SYSTEMS    (Not required)   Review of Systems   Musculoskeletal:  Positive for neck pain. PAST MEDICAL HISTORY     Past Medical History:   Diagnosis Date    GERD (gastroesophageal reflux disease)     Hypercholesterolemia     Migraine     Osteoarthritis     [facility], Dr. Benito Bedoya. Treatments: heat.      Other ill-defined conditions(799.89)     high cholesterol    TMJ (dislocation of temporomandibular joint)        SURGICAL HISTORY       Past Surgical History:   Procedure Laterality Date    BLADDER SUSPENSION      CATARACT REMOVAL  2012    both eyes    COLONOSCOPY N/A 12/12/2016    COLONOSCOPY performed by Ian Acevedo MD at [address] [facility]      hysterectomy    HYSTERECTOMY, TOTAL ABDOMINAL (CERVIX REMOVED)  [facility] [facility]      RT carpal tunnel surgery    ORTHOPEDIC SURGERY      LFT foot broken and tore tendons    OTHER SURGICAL HISTORY  03/2012    sx for intestinal blockage    UROLOGICAL SURGERY      bladder suspension       CURRENT MEDICATIONS

## 2023-09-14 NOTE — DISCHARGE INSTRUCTIONS
Thank you for allowing us to provide you with medical care today. We realize that you have many choices for your emergency care needs. We thank you for choosing [facility]. Please choose us in the future for any continued health care needs. The exam and treatment you received in the Emergency Department were for an emergent problem and are not intended as complete care. It is important that you follow up with a doctor, nurse practitioner, or physician's assistant for ongoing care. If your symptoms worsen or you do not improve as expected and you are unable to reach your usual health care provider, you should return to the Emergency Department. We are available 24 hours a day. Please make an appointment with your health care provider(s) for follow up of your Emergency Department visit. Take this sheet with you when you go to your follow-up visit.

## 2023-09-14 NOTE — ED NOTES
Patient reports some improvement in headache pain neck remains stiff      Tad Francois RN  09/14/23 121

## 2023-09-14 NOTE — ED TRIAGE NOTES
Patient presents ambulatory to treatment area. Patient states she has had right sided neck and head pain and stiffness for about the past two months. States that last night at about 0100, the pain greatly intensified and she is no longer able to move the neck. States her head does not feel like a headache, but rather like a sharp, searing pain the to right side of her head. Denies known injury. Denies known fevers.

## 2023-09-14 NOTE — ED NOTES
The patient was discharged home by Dr Edwin Ram in stable condition. The patient is alert and oriented, in no respiratory distress and discharge vital signs obtained. The patient's diagnosis, condition and treatment were explained. The patient expressed understanding. Prescriptions given/e-scribed to pharmacy. No work/school note given. A discharge plan has been developed. A  was not involved in the process. Aftercare instructions were given. Pt ambulatory out of the ED with spouse.        Sergio Landaverde RN  09/14/23 4521

## 2023-10-30 ENCOUNTER — APPOINTMENT (OUTPATIENT)
Facility: HOSPITAL | Age: 78
End: 2023-10-30
Payer: MEDICARE

## 2023-10-30 ENCOUNTER — HOSPITAL ENCOUNTER (EMERGENCY)
Facility: HOSPITAL | Age: 78
Discharge: HOME | End: 2023-10-30
Attending: STUDENT IN AN ORGANIZED HEALTH CARE EDUCATION/TRAINING PROGRAM
Payer: MEDICARE

## 2023-10-30 VITALS
RESPIRATION RATE: 18 BRPM | SYSTOLIC BLOOD PRESSURE: 120 MMHG | WEIGHT: 155 LBS | DIASTOLIC BLOOD PRESSURE: 68 MMHG | OXYGEN SATURATION: 94 % | BODY MASS INDEX: 23.49 KG/M2 | HEIGHT: 68 IN | TEMPERATURE: 97.9 F | HEART RATE: 85 BPM

## 2023-10-30 DIAGNOSIS — S09.90XA CLOSED HEAD INJURY, INITIAL ENCOUNTER: Primary | ICD-10-CM

## 2023-10-30 DIAGNOSIS — S42.292A OTHER CLOSED DISPLACED FRACTURE OF PROXIMAL END OF LEFT HUMERUS, INITIAL ENCOUNTER: ICD-10-CM

## 2023-10-30 DIAGNOSIS — S01.81XA FACIAL LACERATION, INITIAL ENCOUNTER: ICD-10-CM

## 2023-10-30 PROCEDURE — 73060 X-RAY EXAM OF HUMERUS: CPT

## 2023-10-30 PROCEDURE — 73110 X-RAY EXAM OF WRIST: CPT

## 2023-10-30 PROCEDURE — 6370000000 HC RX 637 (ALT 250 FOR IP): Performed by: EMERGENCY MEDICINE

## 2023-10-30 PROCEDURE — 6360000002 HC RX W HCPCS: Performed by: EMERGENCY MEDICINE

## 2023-10-30 PROCEDURE — 72125 CT NECK SPINE W/O DYE: CPT

## 2023-10-30 PROCEDURE — 96372 THER/PROPH/DIAG INJ SC/IM: CPT

## 2023-10-30 PROCEDURE — 99284 EMERGENCY DEPT VISIT MOD MDM: CPT

## 2023-10-30 PROCEDURE — 12013 RPR F/E/E/N/L/M 2.6-5.0 CM: CPT

## 2023-10-30 PROCEDURE — 70450 CT HEAD/BRAIN W/O DYE: CPT

## 2023-10-30 PROCEDURE — 73030 X-RAY EXAM OF SHOULDER: CPT

## 2023-10-30 RX ORDER — HYDROCODONE BITARTRATE AND ACETAMINOPHEN 5; 325 MG/1; MG/1
1 TABLET ORAL
Status: COMPLETED | OUTPATIENT
Start: 2023-10-30 | End: 2023-10-30

## 2023-10-30 RX ORDER — HYDROCODONE BITARTRATE AND ACETAMINOPHEN 5; 325 MG/1; MG/1
1 TABLET ORAL EVERY 8 HOURS PRN
Qty: 10 TABLET | Refills: 0 | Status: SHIPPED | OUTPATIENT
Start: 2023-10-30 | End: 2023-11-02

## 2023-10-30 RX ORDER — KETOROLAC TROMETHAMINE 30 MG/ML
15 INJECTION, SOLUTION INTRAMUSCULAR; INTRAVENOUS
Status: COMPLETED | OUTPATIENT
Start: 2023-10-30 | End: 2023-10-30

## 2023-10-30 RX ADMIN — KETOROLAC TROMETHAMINE 15 MG: 30 INJECTION, SOLUTION INTRAMUSCULAR; INTRAVENOUS at 12:37

## 2023-10-30 RX ADMIN — HYDROCODONE BITARTRATE AND ACETAMINOPHEN 1 TABLET: 5; 325 TABLET ORAL at 12:38

## 2023-10-30 ASSESSMENT — ENCOUNTER SYMPTOMS
NAUSEA: 0
COUGH: 0
BACK PAIN: 0
ABDOMINAL PAIN: 0
CHEST TIGHTNESS: 0
SHORTNESS OF BREATH: 0
VOMITING: 0
COLOR CHANGE: 0

## 2023-10-30 ASSESSMENT — PAIN DESCRIPTION - DESCRIPTORS: DESCRIPTORS: SORE

## 2023-10-30 ASSESSMENT — PAIN SCALES - GENERAL: PAINLEVEL_OUTOF10: 8

## 2023-10-30 ASSESSMENT — PAIN DESCRIPTION - ORIENTATION: ORIENTATION: LEFT

## 2023-10-30 ASSESSMENT — PAIN DESCRIPTION - LOCATION: LOCATION: SHOULDER

## 2023-10-30 NOTE — ED NOTES
Pt discharged to home at this time. All discharge instructions provided to patient. All questions answered. Pt ambulated from ED without distress.      Ruby Aragon RN  10/30/23 [id number]

## 2023-10-30 NOTE — ED TRIAGE NOTES
Pt presents after fall in kitchen this morning. She landed on L shoulder and hit her head on the counter. Pt reports shoulder pain 8/10. Denies taking blood thinners. Denies LOC.

## 2023-10-30 NOTE — DISCHARGE INSTRUCTIONS
Wear the sling for support of your arm  Please ice in a bag and apply to your arm for 20 minutes at a time to help with pain control  Use acetaminophen for pain. For severe pain use Norco: 1 tablet every 8 hours if needed for pain. Beware sedating effects. Do not drive with this medicine. No alcohol use with this medicine. Do not use Norco and acetaminophen at the same time. The glue on your forehead will dissolve by itself when ready. Please follow-up with the orthopedic surgeon for further treatment of your shoulder. Call tomorrow for an appointment in the next week. Return to the emergency room for any nausea, vomiting, dizziness, lightheadedness, headaches, worsening or change in pain.

## 2024-07-16 ENCOUNTER — HOSPITAL ENCOUNTER (OUTPATIENT)
Facility: HOSPITAL | Age: 79
Discharge: HOME | End: 2024-07-19
Payer: MEDICARE

## 2024-07-16 VITALS
TEMPERATURE: 98.2 F | WEIGHT: 168.21 LBS | RESPIRATION RATE: 20 BRPM | OXYGEN SATURATION: 100 % | BODY MASS INDEX: 25.49 KG/M2 | SYSTOLIC BLOOD PRESSURE: 116 MMHG | HEIGHT: 68 IN | HEART RATE: 70 BPM | DIASTOLIC BLOOD PRESSURE: 70 MMHG

## 2024-07-16 LAB
ABO + RH BLD: NORMAL
ALBUMIN SERPL-MCNC: 3.6 G/DL (ref 3.5–5)
ALBUMIN/GLOB SERPL: 1.2 (ref 1.1–2.2)
ALP SERPL-CCNC: 119 U/L (ref 45–117)
ALT SERPL-CCNC: 20 U/L (ref 12–78)
ANION GAP SERPL CALC-SCNC: 7 MMOL/L (ref 5–15)
APPEARANCE UR: CLEAR
AST SERPL-CCNC: 19 U/L (ref 15–37)
BACTERIA URNS QL MICRO: NEGATIVE /HPF
BILIRUB SERPL-MCNC: 0.7 MG/DL (ref 0.2–1)
BILIRUB UR QL: NEGATIVE
BLOOD GROUP ANTIBODIES SERPL: NORMAL
BUN SERPL-MCNC: 25 MG/DL (ref 6–20)
BUN/CREAT SERPL: 28 (ref 12–20)
CALCIUM SERPL-MCNC: 9.6 MG/DL (ref 8.5–10.1)
CHLORIDE SERPL-SCNC: 113 MMOL/L (ref 97–108)
CO2 SERPL-SCNC: 22 MMOL/L (ref 21–32)
COLOR UR: ABNORMAL
CREAT SERPL-MCNC: 0.89 MG/DL (ref 0.55–1.02)
EKG ATRIAL RATE: 59 BPM
EKG DIAGNOSIS: NORMAL
EKG P AXIS: 26 DEGREES
EKG P-R INTERVAL: 156 MS
EKG Q-T INTERVAL: 400 MS
EKG QRS DURATION: 74 MS
EKG QTC CALCULATION (BAZETT): 396 MS
EKG R AXIS: -30 DEGREES
EKG T AXIS: 30 DEGREES
EKG VENTRICULAR RATE: 59 BPM
EPITH CASTS URNS QL MICRO: ABNORMAL /LPF
ERYTHROCYTE [DISTWIDTH] IN BLOOD BY AUTOMATED COUNT: 13.8 % (ref 11.5–14.5)
EST. AVERAGE GLUCOSE BLD GHB EST-MCNC: 108 MG/DL
GLOBULIN SER CALC-MCNC: 2.9 G/DL (ref 2–4)
GLUCOSE SERPL-MCNC: 93 MG/DL (ref 65–100)
GLUCOSE UR STRIP.AUTO-MCNC: NEGATIVE MG/DL
HBA1C MFR BLD: 5.4 % (ref 4–5.6)
HCT VFR BLD AUTO: 38.4 % (ref 35–47)
HGB BLD-MCNC: 12.5 G/DL (ref 11.5–16)
HGB UR QL STRIP: NEGATIVE
INR PPP: 1 (ref 0.9–1.1)
KETONES UR QL STRIP.AUTO: NEGATIVE MG/DL
LEUKOCYTE ESTERASE UR QL STRIP.AUTO: ABNORMAL
MCH RBC QN AUTO: 29.3 PG (ref 26–34)
MCHC RBC AUTO-ENTMCNC: 32.6 G/DL (ref 30–36.5)
MCV RBC AUTO: 89.9 FL (ref 80–99)
MUCOUS THREADS URNS QL MICRO: ABNORMAL /LPF
NITRITE UR QL STRIP.AUTO: NEGATIVE
NRBC # BLD: 0 K/UL (ref 0–0.01)
NRBC BLD-RTO: 0 PER 100 WBC
PH UR STRIP: 5.5 (ref 5–8)
PLATELET # BLD AUTO: 191 K/UL (ref 150–400)
PMV BLD AUTO: 9.3 FL (ref 8.9–12.9)
POTASSIUM SERPL-SCNC: 3.9 MMOL/L (ref 3.5–5.1)
PROT SERPL-MCNC: 6.5 G/DL (ref 6.4–8.2)
PROT UR STRIP-MCNC: NEGATIVE MG/DL
PROTHROMBIN TIME: 10.3 SEC (ref 9–11.1)
RBC # BLD AUTO: 4.27 M/UL (ref 3.8–5.2)
RBC #/AREA URNS HPF: ABNORMAL /HPF (ref 0–5)
SODIUM SERPL-SCNC: 142 MMOL/L (ref 136–145)
SP GR UR REFRACTOMETRY: 1.02
SPECIMEN EXP DATE BLD: NORMAL
URINE CULTURE IF INDICATED: ABNORMAL
UROBILINOGEN UR QL STRIP.AUTO: 0.2 EU/DL (ref 0.2–1)
WBC # BLD AUTO: 5.4 K/UL (ref 3.6–11)
WBC URNS QL MICRO: ABNORMAL /HPF (ref 0–4)

## 2024-07-16 PROCEDURE — 93005 ELECTROCARDIOGRAM TRACING: CPT | Performed by: ORTHOPAEDIC SURGERY

## 2024-07-16 PROCEDURE — 86901 BLOOD TYPING SEROLOGIC RH(D): CPT

## 2024-07-16 PROCEDURE — 85610 PROTHROMBIN TIME: CPT

## 2024-07-16 PROCEDURE — 36415 COLL VENOUS BLD VENIPUNCTURE: CPT

## 2024-07-16 PROCEDURE — 86850 RBC ANTIBODY SCREEN: CPT

## 2024-07-16 PROCEDURE — 81001 URINALYSIS AUTO W/SCOPE: CPT

## 2024-07-16 PROCEDURE — 86900 BLOOD TYPING SEROLOGIC ABO: CPT

## 2024-07-16 PROCEDURE — 97161 PT EVAL LOW COMPLEX 20 MIN: CPT

## 2024-07-16 PROCEDURE — 85027 COMPLETE CBC AUTOMATED: CPT

## 2024-07-16 PROCEDURE — 80053 COMPREHEN METABOLIC PANEL: CPT

## 2024-07-16 PROCEDURE — 97530 THERAPEUTIC ACTIVITIES: CPT

## 2024-07-16 PROCEDURE — APPNB30 APP NON BILLABLE TIME 0-30 MINS: Performed by: NURSE PRACTITIONER

## 2024-07-16 PROCEDURE — 83036 HEMOGLOBIN GLYCOSYLATED A1C: CPT

## 2024-07-16 RX ORDER — SODIUM CHLORIDE, SODIUM LACTATE, POTASSIUM CHLORIDE, CALCIUM CHLORIDE 600; 310; 30; 20 MG/100ML; MG/100ML; MG/100ML; MG/100ML
INJECTION, SOLUTION INTRAVENOUS CONTINUOUS
OUTPATIENT
Start: 2024-07-25

## 2024-07-16 RX ORDER — CELECOXIB 200 MG/1
400 CAPSULE ORAL ONCE
OUTPATIENT
Start: 2024-07-25

## 2024-07-16 RX ORDER — ACETAMINOPHEN 500 MG
1000 TABLET ORAL ONCE
OUTPATIENT
Start: 2024-07-25

## 2024-07-16 ASSESSMENT — HOOS JR
RISING FROM SITTING: SEVERE
LYING IN BED (TURNING OVER, MAINTAINING HIP POSITION): SEVERE
BENDING TO THE FLOOR TO PICK UP OBJECT: SEVERE
WALKING ON UNEVEN SURFACE: SEVERE
SITTING: MODERATE
GOING UP OR DOWN STAIRS: SEVERE
HOOS JR TOTAL INTERVAL SCORE: 36.363
HOOS JR RAW SCORE: 17
HOOS JR RAW SCORE: 17

## 2024-07-16 ASSESSMENT — PROMIS GLOBAL HEALTH SCALE
IN GENERAL, HOW WOULD YOU RATE YOUR MENTAL HEALTH, INCLUDING YOUR MOOD AND YOUR ABILITY TO THINK [ON A SCALE OF 1 (POOR) TO 5 (EXCELLENT)]?: VERY GOOD
IN GENERAL, WOULD YOU SAY YOUR HEALTH IS...[ON A SCALE OF 1 (POOR) TO 5 (EXCELLENT)]: VERY GOOD
SUM OF RESPONSES TO QUESTIONS 2, 4, 5, & 10: 13
HOW IS THE PROMIS V1.1 BEING ADMINISTERED?: PAPER
TO WHAT EXTENT ARE YOU ABLE TO CARRY OUT YOUR EVERYDAY PHYSICAL ACTIVITIES SUCH AS WALKING, CLIMBING STAIRS, CARRYING GROCERIES, OR MOVING A CHAIR [ON A SCALE OF 1 (NOT AT ALL) TO 5 (COMPLETELY)]?: A LITTLE
WHO IS THE PERSON COMPLETING THE PROMIS V1.1 SURVEY?: SELF
IN THE PAST 7 DAYS, HOW WOULD YOU RATE YOUR FATIGUE ON AVERAGE [ON A SCALE FROM 1 (NONE) TO 5 (VERY SEVERE)]?: MODERATE
IN GENERAL, HOW WOULD YOU RATE YOUR SATISFACTION WITH YOUR SOCIAL ACTIVITIES AND RELATIONSHIPS [ON A SCALE OF 1 (POOR) TO 5 (EXCELLENT)]?: POOR
IN THE PAST 7 DAYS, HOW OFTEN HAVE YOU BEEN BOTHERED BY EMOTIONAL PROBLEMS, SUCH AS FEELING ANXIOUS, DEPRESSED, OR IRRITABLE [ON A SCALE FROM 1 (NEVER) TO 5 (ALWAYS)]?: NEVER
IN THE PAST 7 DAYS, HOW WOULD YOU RATE YOUR PAIN ON AVERAGE [ON A SCALE FROM 0 (NO PAIN) TO 10 (WORST IMAGINABLE PAIN)]?: 8
IN GENERAL, HOW WOULD YOU RATE YOUR PHYSICAL HEALTH [ON A SCALE OF 1 (POOR) TO 5 (EXCELLENT)]?: GOOD
IN GENERAL, PLEASE RATE HOW WELL YOU CARRY OUT YOUR USUAL SOCIAL ACTIVITIES (INCLUDES ACTIVITIES AT HOME, AT WORK, AND IN YOUR COMMUNITY, AND RESPONSIBILITIES AS A PARENT, CHILD, SPOUSE, EMPLOYEE, FRIEND, ETC) [ON A SCALE OF 1 (POOR) TO 5 (EXCELLENT)]?: POOR
SUM OF RESPONSES TO QUESTIONS 3, 6, 7, & 8: 16
IN GENERAL, WOULD YOU SAY YOUR QUALITY OF LIFE IS...[ON A SCALE OF 1 (POOR) TO 5 (EXCELLENT)]: GOOD

## 2024-07-16 ASSESSMENT — PAIN DESCRIPTION - LOCATION: LOCATION: HIP

## 2024-07-16 ASSESSMENT — PAIN DESCRIPTION - ORIENTATION: ORIENTATION: LEFT

## 2024-07-16 ASSESSMENT — PAIN DESCRIPTION - DESCRIPTORS: DESCRIPTORS: ACHING;BURNING;SHOOTING

## 2024-07-16 ASSESSMENT — PAIN SCALES - GENERAL: PAINLEVEL_OUTOF10: 5

## 2024-07-17 LAB
BACTERIA SPEC CULT: NORMAL
BACTERIA SPEC CULT: NORMAL
SERVICE CMNT-IMP: NORMAL

## 2024-07-23 NOTE — H&P
Bernard Che MD - Adult Reconstruction and Total Joint Replacement     Orthopaedic History and Physical        NAME: Gabrielle Ramírez       :  1945       MRN:  [id number]        Supervising Physician: Bernard Che MD    Voice recognition software is utilized and this note may contain transcription errors. An addendum can be issued upon request.    HISTORY OF PRESENT ILLNESS:  Chief Complaint: Pain of the Left Hip Age: 78 y.o.  Sex: female  Hand-dominance: right    History of present illness: Ms. Ramírez presents to clinic complaining of left hip pain. She has pain in a C shape pattern around her hip and burning pain down the medial thigh. She complains of achy pain in the quad and states her knee has started bothering her more and more over the last few weeks. She states she cannot get comfortable at night. She cannot put socks and shoes on due to mobility issues. She has tried NSIADs and tylenol to help with the pain. She states she is not as ambulatory as she use to be because she cannot walk for any length of time due to the pain in her hip.    Patient Active Problem List    Diagnosis Date Noted    SBO (small bowel obstruction) ([facility]) 2012    Osteoporosis 04/15/2011    Vitamin D deficiency 04/15/2011     Past Medical History:   Diagnosis Date    Anxiety     At risk for falls     GERD (gastroesophageal reflux disease)     [place] (hard of hearing)     b/l hearing aids    Hypercholesterolemia     Hypoglycemia     Migraine     Osteoarthritis     hand institue, Dr. watson.    TMJ (dislocation of temporomandibular joint)       Past Surgical History:   Procedure Laterality Date    BLADDER SUSPENSION      CATARACT REMOVAL  2012    both eyes    COLONOSCOPY N/A 2016    COLONOS[facility] performed by Conrado Glaser MD at [facility] ENDOSCOPY    GYN      hysterectomy    HYSTERECTOMY, TOTAL ABDOMINAL (CERVIX REMOVED)      ORTHOPEDIC SURGERY      RT carpal tunnel surgery    ORTHOPEDIC SURGERY      LFT foot  proceed with a total hip replacement. Conservative treatments have not successfully relieved pain. Surgery was discussed at length with the patient today. We went over all the pertinent risks, benefits, and alternatives to the procedure. They understand no guarantees can be made about the outcome and they would like to proceed. I discussed the pre-op total joint expectations and general recovery timeline with the patient. The patient understands the need for medical clearance. All questions were answered to her satisfaction. Dr. Che went in and spoke to her as well.    There are no Patient Instructions on file for this visit.    New Prescriptions  No medications on file    Orders Placed This Encounter  X-ray knee left 1 or 2 views ([id number])  BMI Patient Education        REVIEW OF SYSTEMS:  Review of Systems  7/2/2024    Constitutional: Unexplained: Negative  Genitourinary: Frequent Urination: Positive  HEENT: Vision Loss: Negative  Neurological: Memory Loss: Negative  Integumentary: Rash: Negative  Cardiovascular: Palpatations: Negative  Hematologic: Bruises/Bleeds Easily: Negative  Gastrointestinal: Constipation: Negative  Immunological: Seasonal Allergies: Negative  Musculoskeletal: Joint Pain: Positive    OBJECTIVE:  Constitutional: No acute distress. Her body mass index is 28.87 kg/m².  Eyes: Sclera are nonicteric.  Respiratory: No labored breathing.  Cardiovascular: No marked edema.  Skin: No marked skin ulcers.  Neurological: No marked sensory loss noted.  Psychiatric: Alert and oriented x3.    Musculoskeletal  Left hip:  No TTP. Limited pain range of motion. Muscle strength 5/5. Lumbar ROM is WNL. Slight trendelenburg gait noted.    Left knee:  No edema. TTP on the medial joint line. Normal range of motion.    IMAGING / STUDIES:  Order: XR KNEE 1 OR 2 VW LEFT - Indication: Primary osteoarthritis of  left hip    X-ray knee left 1 or 2 views ([id number])    Result Date: 7/2/2024  Weight Bearing. Views: Standing AP,

## 2024-07-24 NOTE — DISCHARGE INSTRUCTIONS
Discharge Instructions:  Gabrielle Harleyjerry    Surgery: TOTAL HIP REPLACEMENT.        To relieve pain:  Use ice/gel packs.    -Put the ice pack directly over the wound, or anywhere you are hurting or swollen.   -To control pain and swelling, keep ice on regularly, especially after physical activity.  -The packs should stay cold for 3-4 hours.  When it is not cold anymore, rotate with the packs in the freezer.      Elevate your leg.  This will also keep swelling down.    Rest for at least 20 minutes between activity or exercises.    To keep track of your pain medications, write down what you take and when you take it.    The last dose of pain medication you got in the hospital was:     Medication    Dose    Date & Time      Choose your medications based on the pain scale below:    To keep your pain under control, take Tylenol every 6 hours for 14 days - even if you feel like you don’t need it.     For mild to moderate pain (4-6 on pain scale), take one pain pill every 4 hours or as instructed.     For severe pain (7-10 on pain scale), take two pain pills every 4 hours or as instructed.     To prevent nausea, take your pain medications with food.                                            Pain Scale              As your pain lessens:    Slowly start taking less pain medication. You may do this by waiting longer between doses or by taking smaller doses.    Stop using the pain medications as soon as you no longer need it, usually in 2-3 weeks.        Aspirin  To prevent blood clots, you will need to take Aspirin 81 mg twice a day for 30 days.              To prevent stomach upset or bleeding:  Take Pepcid 20 mg twice a day, or a similar home medication, while you are taking a blood thinner.         Keep your waterproof dressing in place. It will be removed by your surgeon during your follow-up appointment in 2 weeks.     You may need to change the dressing if you are having drainage to where the dressing is no longer

## 2024-07-25 ENCOUNTER — ANESTHESIA EVENT (OUTPATIENT)
Facility: HOSPITAL | Age: 79
DRG: 470 | End: 2024-07-25
Payer: MEDICARE

## 2024-07-25 ENCOUNTER — HOSPITAL ENCOUNTER (INPATIENT)
Facility: HOSPITAL | Age: 79
LOS: 1 days | Discharge: HOME HEALTH | DRG: 470 | End: 2024-07-25
Attending: ORTHOPAEDIC SURGERY | Admitting: ORTHOPAEDIC SURGERY
Payer: MEDICARE

## 2024-07-25 ENCOUNTER — APPOINTMENT (OUTPATIENT)
Facility: HOSPITAL | Age: 79
DRG: 470 | End: 2024-07-25
Attending: ORTHOPAEDIC SURGERY
Payer: MEDICARE

## 2024-07-25 ENCOUNTER — ANESTHESIA (OUTPATIENT)
Facility: HOSPITAL | Age: 79
DRG: 470 | End: 2024-07-25
Payer: MEDICARE

## 2024-07-25 VITALS
SYSTOLIC BLOOD PRESSURE: 139 MMHG | HEART RATE: 101 BPM | OXYGEN SATURATION: 99 % | WEIGHT: 158.95 LBS | BODY MASS INDEX: 24.09 KG/M2 | RESPIRATION RATE: 16 BRPM | HEIGHT: 68 IN | DIASTOLIC BLOOD PRESSURE: 76 MMHG | TEMPERATURE: 98.2 F

## 2024-07-25 DIAGNOSIS — M16.12 PRIMARY OSTEOARTHRITIS OF LEFT HIP: Primary | ICD-10-CM

## 2024-07-25 PROBLEM — M19.90 DJD (DEGENERATIVE JOINT DISEASE): Status: ACTIVE | Noted: 2024-07-25

## 2024-07-25 PROCEDURE — 6360000002 HC RX W HCPCS

## 2024-07-25 PROCEDURE — 1100000000 HC RM PRIVATE

## 2024-07-25 PROCEDURE — 6370000000 HC RX 637 (ALT 250 FOR IP): Performed by: STUDENT IN AN ORGANIZED HEALTH CARE EDUCATION/TRAINING PROGRAM

## 2024-07-25 PROCEDURE — 97116 GAIT TRAINING THERAPY: CPT

## 2024-07-25 PROCEDURE — C1776 JOINT DEVICE (IMPLANTABLE): HCPCS | Performed by: ORTHOPAEDIC SURGERY

## 2024-07-25 PROCEDURE — 2720000010 HC SURG SUPPLY STERILE: Performed by: ORTHOPAEDIC SURGERY

## 2024-07-25 PROCEDURE — 2580000003 HC RX 258

## 2024-07-25 PROCEDURE — 3700000000 HC ANESTHESIA ATTENDED CARE: Performed by: ORTHOPAEDIC SURGERY

## 2024-07-25 PROCEDURE — 97162 PT EVAL MOD COMPLEX 30 MIN: CPT

## 2024-07-25 PROCEDURE — 6360000002 HC RX W HCPCS: Performed by: PHYSICIAN ASSISTANT

## 2024-07-25 PROCEDURE — 2580000003 HC RX 258: Performed by: PHYSICIAN ASSISTANT

## 2024-07-25 PROCEDURE — 0SRB03Z REPLACEMENT OF LEFT HIP JOINT WITH CERAMIC SYNTHETIC SUBSTITUTE, OPEN APPROACH: ICD-10-PCS | Performed by: ORTHOPAEDIC SURGERY

## 2024-07-25 PROCEDURE — 2580000003 HC RX 258: Performed by: ANESTHESIOLOGY

## 2024-07-25 PROCEDURE — 97535 SELF CARE MNGMENT TRAINING: CPT

## 2024-07-25 PROCEDURE — 3700000001 HC ADD 15 MINUTES (ANESTHESIA): Performed by: ORTHOPAEDIC SURGERY

## 2024-07-25 PROCEDURE — 7100000001 HC PACU RECOVERY - ADDTL 15 MIN: Performed by: ORTHOPAEDIC SURGERY

## 2024-07-25 PROCEDURE — 7100000000 HC PACU RECOVERY - FIRST 15 MIN: Performed by: ORTHOPAEDIC SURGERY

## 2024-07-25 PROCEDURE — 3600000015 HC SURGERY LEVEL 5 ADDTL 15MIN: Performed by: ORTHOPAEDIC SURGERY

## 2024-07-25 PROCEDURE — 2709999900 HC NON-CHARGEABLE SUPPLY: Performed by: ORTHOPAEDIC SURGERY

## 2024-07-25 PROCEDURE — 3E0U33Z INTRODUCTION OF ANTI-INFLAMMATORY INTO JOINTS, PERCUTANEOUS APPROACH: ICD-10-PCS | Performed by: ORTHOPAEDIC SURGERY

## 2024-07-25 PROCEDURE — 6360000002 HC RX W HCPCS: Performed by: ORTHOPAEDIC SURGERY

## 2024-07-25 PROCEDURE — 6370000000 HC RX 637 (ALT 250 FOR IP): Performed by: PHYSICIAN ASSISTANT

## 2024-07-25 PROCEDURE — 6370000000 HC RX 637 (ALT 250 FOR IP): Performed by: ORTHOPAEDIC SURGERY

## 2024-07-25 PROCEDURE — 97530 THERAPEUTIC ACTIVITIES: CPT

## 2024-07-25 PROCEDURE — 97165 OT EVAL LOW COMPLEX 30 MIN: CPT

## 2024-07-25 PROCEDURE — 2500000003 HC RX 250 WO HCPCS

## 2024-07-25 PROCEDURE — 3600000005 HC SURGERY LEVEL 5 BASE: Performed by: ORTHOPAEDIC SURGERY

## 2024-07-25 PROCEDURE — 64447 NJX AA&/STRD FEMORAL NRV IMG: CPT | Performed by: STUDENT IN AN ORGANIZED HEALTH CARE EDUCATION/TRAINING PROGRAM

## 2024-07-25 DEVICE — COMPONENT TOT HIP CAPPED H2 ADV: Type: IMPLANTABLE DEVICE | Site: HIP | Status: FUNCTIONAL

## 2024-07-25 DEVICE — IMPLANTABLE DEVICE
Type: IMPLANTABLE DEVICE | Site: HIP | Status: FUNCTIONAL
Brand: SPARTAN STEM

## 2024-07-25 DEVICE — ALTEON CUP
Type: IMPLANTABLE DEVICE | Site: HIP | Status: FUNCTIONAL
Brand: ALTEON

## 2024-07-25 DEVICE — IMPLANTABLE DEVICE
Type: IMPLANTABLE DEVICE | Site: HIP | Status: FUNCTIONAL
Brand: ALTEON

## 2024-07-25 DEVICE — IMPLANTABLE DEVICE
Type: IMPLANTABLE DEVICE | Site: HIP | Status: FUNCTIONAL
Brand: SIGNATURE FEMORAL HEAD

## 2024-07-25 RX ORDER — ROPIVACAINE HYDROCHLORIDE 5 MG/ML
INJECTION, SOLUTION EPIDURAL; INFILTRATION; PERINEURAL PRN
Status: DISCONTINUED | OUTPATIENT
Start: 2024-07-25 | End: 2024-07-25 | Stop reason: ALTCHOICE

## 2024-07-25 RX ORDER — BISACODYL 10 MG
10 SUPPOSITORY, RECTAL RECTAL DAILY PRN
Status: DISCONTINUED | OUTPATIENT
Start: 2024-07-26 | End: 2024-07-25 | Stop reason: HOSPADM

## 2024-07-25 RX ORDER — TRAMADOL HYDROCHLORIDE 50 MG/1
50 TABLET ORAL EVERY 6 HOURS PRN
Status: DISCONTINUED | OUTPATIENT
Start: 2024-07-25 | End: 2024-07-25 | Stop reason: HOSPADM

## 2024-07-25 RX ORDER — SODIUM CHLORIDE, SODIUM LACTATE, POTASSIUM CHLORIDE, CALCIUM CHLORIDE 600; 310; 30; 20 MG/100ML; MG/100ML; MG/100ML; MG/100ML
INJECTION, SOLUTION INTRAVENOUS CONTINUOUS PRN
Status: DISCONTINUED | OUTPATIENT
Start: 2024-07-25 | End: 2024-07-25 | Stop reason: SDUPTHER

## 2024-07-25 RX ORDER — FENTANYL CITRATE 50 UG/ML
25 INJECTION, SOLUTION INTRAMUSCULAR; INTRAVENOUS EVERY 5 MIN PRN
Status: DISCONTINUED | OUTPATIENT
Start: 2024-07-25 | End: 2024-07-25 | Stop reason: HOSPADM

## 2024-07-25 RX ORDER — SENNA AND DOCUSATE SODIUM 50; 8.6 MG/1; MG/1
1 TABLET, FILM COATED ORAL 2 TIMES DAILY
Status: DISCONTINUED | OUTPATIENT
Start: 2024-07-25 | End: 2024-07-25 | Stop reason: HOSPADM

## 2024-07-25 RX ORDER — HYDROMORPHONE HYDROCHLORIDE 1 MG/ML
0.5 INJECTION, SOLUTION INTRAMUSCULAR; INTRAVENOUS; SUBCUTANEOUS EVERY 5 MIN PRN
Status: DISCONTINUED | OUTPATIENT
Start: 2024-07-25 | End: 2024-07-25 | Stop reason: HOSPADM

## 2024-07-25 RX ORDER — 0.9 % SODIUM CHLORIDE 0.9 %
500 INTRAVENOUS SOLUTION INTRAVENOUS
Status: DISCONTINUED | OUTPATIENT
Start: 2024-07-25 | End: 2024-07-25 | Stop reason: HOSPADM

## 2024-07-25 RX ORDER — EPHEDRINE SULFATE/0.9% NACL/PF 50 MG/5 ML
SYRINGE (ML) INTRAVENOUS PRN
Status: DISCONTINUED | OUTPATIENT
Start: 2024-07-25 | End: 2024-07-25 | Stop reason: SDUPTHER

## 2024-07-25 RX ORDER — SODIUM CHLORIDE 0.9 % (FLUSH) 0.9 %
5-40 SYRINGE (ML) INJECTION EVERY 12 HOURS SCHEDULED
Status: DISCONTINUED | OUTPATIENT
Start: 2024-07-25 | End: 2024-07-25 | Stop reason: HOSPADM

## 2024-07-25 RX ORDER — ACETAMINOPHEN 650 MG
TABLET, EXTENDED RELEASE ORAL PRN
Status: DISCONTINUED | OUTPATIENT
Start: 2024-07-25 | End: 2024-07-25 | Stop reason: ALTCHOICE

## 2024-07-25 RX ORDER — ROPIVACAINE HYDROCHLORIDE 5 MG/ML
INJECTION, SOLUTION EPIDURAL; INFILTRATION; PERINEURAL PRN
Status: DISCONTINUED | OUTPATIENT
Start: 2024-07-25 | End: 2024-07-25 | Stop reason: SDUPTHER

## 2024-07-25 RX ORDER — FAMOTIDINE 20 MG/1
20 TABLET, FILM COATED ORAL 2 TIMES DAILY
Status: DISCONTINUED | OUTPATIENT
Start: 2024-07-25 | End: 2024-07-25 | Stop reason: HOSPADM

## 2024-07-25 RX ORDER — TRANEXAMIC ACID 100 MG/ML
INJECTION, SOLUTION INTRAVENOUS PRN
Status: DISCONTINUED | OUTPATIENT
Start: 2024-07-25 | End: 2024-07-25 | Stop reason: SDUPTHER

## 2024-07-25 RX ORDER — LIDOCAINE HYDROCHLORIDE 20 MG/ML
INJECTION, SOLUTION EPIDURAL; INFILTRATION; INTRACAUDAL; PERINEURAL PRN
Status: DISCONTINUED | OUTPATIENT
Start: 2024-07-25 | End: 2024-07-25 | Stop reason: SDUPTHER

## 2024-07-25 RX ORDER — BISACODYL 10 MG
10 SUPPOSITORY, RECTAL RECTAL DAILY PRN
Qty: 20 SUPPOSITORY | Refills: 0 | Status: SHIPPED | OUTPATIENT
Start: 2024-07-26 | End: 2024-08-25

## 2024-07-25 RX ORDER — FENTANYL CITRATE 50 UG/ML
INJECTION, SOLUTION INTRAMUSCULAR; INTRAVENOUS PRN
Status: DISCONTINUED | OUTPATIENT
Start: 2024-07-25 | End: 2024-07-25 | Stop reason: SDUPTHER

## 2024-07-25 RX ORDER — ONDANSETRON 4 MG/1
4 TABLET, ORALLY DISINTEGRATING ORAL EVERY 8 HOURS PRN
Status: DISCONTINUED | OUTPATIENT
Start: 2024-07-25 | End: 2024-07-25 | Stop reason: HOSPADM

## 2024-07-25 RX ORDER — PROPOFOL 10 MG/ML
INJECTION, EMULSION INTRAVENOUS CONTINUOUS PRN
Status: DISCONTINUED | OUTPATIENT
Start: 2024-07-25 | End: 2024-07-25 | Stop reason: SDUPTHER

## 2024-07-25 RX ORDER — SODIUM CHLORIDE 9 MG/ML
INJECTION, SOLUTION INTRAVENOUS CONTINUOUS
Status: DISCONTINUED | OUTPATIENT
Start: 2024-07-25 | End: 2024-07-25 | Stop reason: HOSPADM

## 2024-07-25 RX ORDER — SODIUM CHLORIDE 0.9 % (FLUSH) 0.9 %
5-40 SYRINGE (ML) INJECTION PRN
Status: DISCONTINUED | OUTPATIENT
Start: 2024-07-25 | End: 2024-07-25 | Stop reason: HOSPADM

## 2024-07-25 RX ORDER — NALOXONE HYDROCHLORIDE 0.4 MG/ML
INJECTION, SOLUTION INTRAMUSCULAR; INTRAVENOUS; SUBCUTANEOUS PRN
Status: DISCONTINUED | OUTPATIENT
Start: 2024-07-25 | End: 2024-07-25 | Stop reason: HOSPADM

## 2024-07-25 RX ORDER — ONDANSETRON 2 MG/ML
4 INJECTION INTRAMUSCULAR; INTRAVENOUS
Status: DISCONTINUED | OUTPATIENT
Start: 2024-07-25 | End: 2024-07-25 | Stop reason: HOSPADM

## 2024-07-25 RX ORDER — ONDANSETRON 2 MG/ML
4 INJECTION INTRAMUSCULAR; INTRAVENOUS EVERY 6 HOURS PRN
Status: DISCONTINUED | OUTPATIENT
Start: 2024-07-25 | End: 2024-07-25 | Stop reason: HOSPADM

## 2024-07-25 RX ORDER — MORPHINE SULFATE 2 MG/ML
2 INJECTION, SOLUTION INTRAMUSCULAR; INTRAVENOUS
Status: DISCONTINUED | OUTPATIENT
Start: 2024-07-25 | End: 2024-07-25 | Stop reason: HOSPADM

## 2024-07-25 RX ORDER — ACETAMINOPHEN 325 MG/1
650 TABLET ORAL EVERY 6 HOURS
Status: DISCONTINUED | OUTPATIENT
Start: 2024-07-25 | End: 2024-07-25 | Stop reason: HOSPADM

## 2024-07-25 RX ORDER — ONDANSETRON 2 MG/ML
INJECTION INTRAMUSCULAR; INTRAVENOUS PRN
Status: DISCONTINUED | OUTPATIENT
Start: 2024-07-25 | End: 2024-07-25 | Stop reason: SDUPTHER

## 2024-07-25 RX ORDER — DIPHENHYDRAMINE HCL 25 MG
25 CAPSULE ORAL EVERY 6 HOURS PRN
Status: DISCONTINUED | OUTPATIENT
Start: 2024-07-25 | End: 2024-07-25 | Stop reason: HOSPADM

## 2024-07-25 RX ORDER — ASPIRIN 81 MG/1
81 TABLET ORAL 2 TIMES DAILY
Status: DISCONTINUED | OUTPATIENT
Start: 2024-07-25 | End: 2024-07-25 | Stop reason: HOSPADM

## 2024-07-25 RX ORDER — TRAMADOL HYDROCHLORIDE 50 MG/1
100 TABLET ORAL EVERY 6 HOURS PRN
Status: DISCONTINUED | OUTPATIENT
Start: 2024-07-25 | End: 2024-07-25 | Stop reason: HOSPADM

## 2024-07-25 RX ORDER — DIPHENHYDRAMINE HYDROCHLORIDE 50 MG/ML
25 INJECTION INTRAMUSCULAR; INTRAVENOUS EVERY 6 HOURS PRN
Status: DISCONTINUED | OUTPATIENT
Start: 2024-07-25 | End: 2024-07-25 | Stop reason: HOSPADM

## 2024-07-25 RX ORDER — HYDROMORPHONE HYDROCHLORIDE 2 MG/ML
INJECTION, SOLUTION INTRAMUSCULAR; INTRAVENOUS; SUBCUTANEOUS PRN
Status: DISCONTINUED | OUTPATIENT
Start: 2024-07-25 | End: 2024-07-25 | Stop reason: SDUPTHER

## 2024-07-25 RX ORDER — OXYCODONE HYDROCHLORIDE 5 MG/1
5 TABLET ORAL
Status: COMPLETED | OUTPATIENT
Start: 2024-07-25 | End: 2024-07-25

## 2024-07-25 RX ORDER — CYCLOBENZAPRINE HCL 10 MG
10 TABLET ORAL EVERY 12 HOURS PRN
Status: DISCONTINUED | OUTPATIENT
Start: 2024-07-25 | End: 2024-07-25 | Stop reason: HOSPADM

## 2024-07-25 RX ORDER — CELECOXIB 200 MG/1
400 CAPSULE ORAL ONCE
Status: COMPLETED | OUTPATIENT
Start: 2024-07-25 | End: 2024-07-25

## 2024-07-25 RX ORDER — SODIUM CHLORIDE, SODIUM LACTATE, POTASSIUM CHLORIDE, CALCIUM CHLORIDE 600; 310; 30; 20 MG/100ML; MG/100ML; MG/100ML; MG/100ML
INJECTION, SOLUTION INTRAVENOUS CONTINUOUS
Status: DISCONTINUED | OUTPATIENT
Start: 2024-07-25 | End: 2024-07-25 | Stop reason: HOSPADM

## 2024-07-25 RX ORDER — DEXAMETHASONE SODIUM PHOSPHATE 4 MG/ML
INJECTION, SOLUTION INTRA-ARTICULAR; INTRALESIONAL; INTRAMUSCULAR; INTRAVENOUS; SOFT TISSUE PRN
Status: DISCONTINUED | OUTPATIENT
Start: 2024-07-25 | End: 2024-07-25 | Stop reason: SDUPTHER

## 2024-07-25 RX ORDER — TRAMADOL HYDROCHLORIDE 50 MG/1
50 TABLET ORAL EVERY 6 HOURS PRN
Qty: 28 TABLET | Refills: 0 | Status: SHIPPED | OUTPATIENT
Start: 2024-07-25 | End: 2024-08-01

## 2024-07-25 RX ORDER — POLYETHYLENE GLYCOL 3350 17 G/17G
17 POWDER, FOR SOLUTION ORAL DAILY
Status: DISCONTINUED | OUTPATIENT
Start: 2024-07-25 | End: 2024-07-25 | Stop reason: HOSPADM

## 2024-07-25 RX ORDER — FAMOTIDINE 20 MG/1
20 TABLET, FILM COATED ORAL DAILY
Qty: 60 TABLET | Refills: 3 | Status: SHIPPED | OUTPATIENT
Start: 2024-07-25

## 2024-07-25 RX ORDER — PHENYLEPHRINE HCL IN 0.9% NACL 0.4MG/10ML
SYRINGE (ML) INTRAVENOUS PRN
Status: DISCONTINUED | OUTPATIENT
Start: 2024-07-25 | End: 2024-07-25 | Stop reason: SDUPTHER

## 2024-07-25 RX ORDER — CEFAZOLIN SODIUM 1 G/3ML
INJECTION, POWDER, FOR SOLUTION INTRAMUSCULAR; INTRAVENOUS PRN
Status: DISCONTINUED | OUTPATIENT
Start: 2024-07-25 | End: 2024-07-25 | Stop reason: SDUPTHER

## 2024-07-25 RX ORDER — ASPIRIN 81 MG/1
81 TABLET ORAL 2 TIMES DAILY
Qty: 30 TABLET | Refills: 3 | Status: SHIPPED | OUTPATIENT
Start: 2024-07-25

## 2024-07-25 RX ORDER — ACETAMINOPHEN 500 MG
1000 TABLET ORAL ONCE
Status: COMPLETED | OUTPATIENT
Start: 2024-07-25 | End: 2024-07-25

## 2024-07-25 RX ADMIN — PROPOFOL 20 MG: 10 INJECTION, EMULSION INTRAVENOUS at 07:46

## 2024-07-25 RX ADMIN — PROPOFOL 30 MG: 10 INJECTION, EMULSION INTRAVENOUS at 08:21

## 2024-07-25 RX ADMIN — FENTANYL CITRATE 25 MCG: 50 INJECTION, SOLUTION INTRAMUSCULAR; INTRAVENOUS at 08:54

## 2024-07-25 RX ADMIN — FENTANYL CITRATE 25 MCG: 50 INJECTION, SOLUTION INTRAMUSCULAR; INTRAVENOUS at 09:13

## 2024-07-25 RX ADMIN — SODIUM CHLORIDE, POTASSIUM CHLORIDE, SODIUM LACTATE AND CALCIUM CHLORIDE: 600; 310; 30; 20 INJECTION, SOLUTION INTRAVENOUS at 07:50

## 2024-07-25 RX ADMIN — HYDROMORPHONE HYDROCHLORIDE 0.2 MG: 2 INJECTION INTRAMUSCULAR; INTRAVENOUS; SUBCUTANEOUS at 09:24

## 2024-07-25 RX ADMIN — SODIUM CHLORIDE: 9 INJECTION, SOLUTION INTRAVENOUS at 12:15

## 2024-07-25 RX ADMIN — TRANEXAMIC ACID 1000 MG: 100 INJECTION, SOLUTION INTRAVENOUS at 08:42

## 2024-07-25 RX ADMIN — CEFAZOLIN 2 G: 1 INJECTION, POWDER, FOR SOLUTION INTRAMUSCULAR; INTRAVENOUS at 08:42

## 2024-07-25 RX ADMIN — PHENYLEPHRINE HYDROCHLORIDE 20 MCG/MIN: 10 INJECTION INTRAVENOUS at 08:15

## 2024-07-25 RX ADMIN — SODIUM CHLORIDE, POTASSIUM CHLORIDE, SODIUM LACTATE AND CALCIUM CHLORIDE: 600; 310; 30; 20 INJECTION, SOLUTION INTRAVENOUS at 07:57

## 2024-07-25 RX ADMIN — Medication 5 MG: at 09:12

## 2024-07-25 RX ADMIN — ACETAMINOPHEN 1000 MG: 500 TABLET ORAL at 07:26

## 2024-07-25 RX ADMIN — Medication 3 AMPULE: at 07:27

## 2024-07-25 RX ADMIN — SODIUM CHLORIDE, PRESERVATIVE FREE 10 ML: 5 INJECTION INTRAVENOUS at 12:15

## 2024-07-25 RX ADMIN — OXYCODONE 5 MG: 5 TABLET ORAL at 10:19

## 2024-07-25 RX ADMIN — PROPOFOL 20 MG: 10 INJECTION, EMULSION INTRAVENOUS at 08:00

## 2024-07-25 RX ADMIN — ONDANSETRON 4 MG: 2 INJECTION INTRAMUSCULAR; INTRAVENOUS at 08:47

## 2024-07-25 RX ADMIN — HYDROMORPHONE HYDROCHLORIDE 0.2 MG: 2 INJECTION INTRAMUSCULAR; INTRAVENOUS; SUBCUTANEOUS at 09:31

## 2024-07-25 RX ADMIN — ACETAMINOPHEN 650 MG: 325 TABLET ORAL at 15:18

## 2024-07-25 RX ADMIN — Medication 40 MCG: at 08:13

## 2024-07-25 RX ADMIN — FENTANYL CITRATE 25 MCG: 50 INJECTION, SOLUTION INTRAMUSCULAR; INTRAVENOUS at 08:44

## 2024-07-25 RX ADMIN — WATER 2000 MG: 1 INJECTION INTRAMUSCULAR; INTRAVENOUS; SUBCUTANEOUS at 16:07

## 2024-07-25 RX ADMIN — FENTANYL CITRATE 25 MCG: 50 INJECTION, SOLUTION INTRAMUSCULAR; INTRAVENOUS at 09:02

## 2024-07-25 RX ADMIN — TRANEXAMIC ACID 1000 MG: 100 INJECTION, SOLUTION INTRAVENOUS at 09:14

## 2024-07-25 RX ADMIN — LIDOCAINE HYDROCHLORIDE 50 MG: 20 INJECTION, SOLUTION EPIDURAL; INFILTRATION; INTRACAUDAL; PERINEURAL at 08:16

## 2024-07-25 RX ADMIN — MEPIVACAINE HYDROCHLORIDE 2.8 ML: 20 INJECTION, SOLUTION EPIDURAL; INFILTRATION at 07:49

## 2024-07-25 RX ADMIN — ROPIVACAINE HYDROCHLORIDE 15 ML: 5 INJECTION, SOLUTION EPIDURAL; INFILTRATION; PERINEURAL at 08:06

## 2024-07-25 RX ADMIN — DEXAMETHASONE SODIUM PHOSPHATE 10 MG: 4 INJECTION INTRA-ARTICULAR; INTRALESIONAL; INTRAMUSCULAR; INTRAVENOUS; SOFT TISSUE at 08:47

## 2024-07-25 RX ADMIN — CELECOXIB 400 MG: 200 CAPSULE ORAL at 07:26

## 2024-07-25 RX ADMIN — PROPOFOL 70 MCG/KG/MIN: 10 INJECTION, EMULSION INTRAVENOUS at 08:17

## 2024-07-25 RX ADMIN — TRAMADOL HYDROCHLORIDE 50 MG: 50 TABLET ORAL at 15:18

## 2024-07-25 RX ADMIN — SODIUM CHLORIDE, POTASSIUM CHLORIDE, SODIUM LACTATE AND CALCIUM CHLORIDE: 600; 310; 30; 20 INJECTION, SOLUTION INTRAVENOUS at 08:39

## 2024-07-25 RX ADMIN — Medication 5 MG: at 08:52

## 2024-07-25 ASSESSMENT — PAIN SCALES - GENERAL
PAINLEVEL_OUTOF10: 2
PAINLEVEL_OUTOF10: 1

## 2024-07-25 ASSESSMENT — PAIN - FUNCTIONAL ASSESSMENT: PAIN_FUNCTIONAL_ASSESSMENT: 0-10

## 2024-07-25 ASSESSMENT — PAIN DESCRIPTION - LOCATION: LOCATION: HIP

## 2024-07-25 ASSESSMENT — PAIN SCALES - WONG BAKER: WONGBAKER_NUMERICALRESPONSE: NO HURT

## 2024-07-25 ASSESSMENT — PAIN DESCRIPTION - ORIENTATION: ORIENTATION: LEFT

## 2024-07-25 NOTE — PLAN OF CARE
Problem: Physical Therapy - Adult  Goal: By Discharge: Performs mobility at highest level of function for planned discharge setting.  See evaluation for individualized goals.  Description: FUNCTIONAL STATUS PRIOR TO ADMISSION: Patient was independent and active without use of DME. History of 2 falls over previous year.     HOME SUPPORT PRIOR TO ADMISSION: The patient lived with  who will assist at discharge.    Physical Therapy Goals  Initiated 7/25/2024  1.  Patient will move from supine to sit and sit to supine, scoot up and down, and roll side to side in bed with modified independence within 4 day(s).    2.  Patient will perform sit to stand with modified independence within 4 day(s).  3.  Patient will transfer from bed to chair and chair to bed with modified independence using the least restrictive device within 4 day(s).  4.  Patient will ambulate with modified independence for 300 feet with the least restrictive device within 4 day(s).   5.  Patient will ascend/descend 1 stairs with 1 handrail(s) with modified independence within 4 day(s).  6.  Patient will perform LE strengthening home exercise program per protocol with independence within 4 days.     Outcome: Progressing   PHYSICAL THERAPY EVALUATION    Patient: Gabrielle Ramírez (78 y.o. female)  Date: 7/25/2024  Primary Diagnosis: Primary osteoarthritis of left hip [M16.12]  Primary osteoarthritis of left knee [M17.12]  DJD (degenerative joint disease) [M19.90]  Procedure(s) (LRB):  LEFT TOTAL HIP ARTHROPLASTY ANTERIOR APPROACH AND LEFT KNEE INJECTION (N/A) Day of Surgery   Precautions: Restrictions/Precautions: Weight Bearing   Lower Extremity Weight Bearing Restrictions  Left Lower Extremity Weight Bearing: Weight Bearing As Tolerated                  ASSESSMENT :   DEFICITS/IMPAIRMENTS:   The patient is limited by altered gait pattern, mild c/o pain, generalized weakness, and overall impaired functional mobility. Pt tolerated therapy session

## 2024-07-25 NOTE — PROGRESS NOTES
Exceptions: Bilateral hearing aid, Hard of hearing/hearing concerns    Vision/Perceptual:          Vision  Vision: Impaired (nystagmus present)  Vision Exceptions: Wears glasses at all times  Perception  Overall Perceptual Status: WFL    Range of Motion:   AROM: Within functional limits (besides LLE)         Strength:  Strength: Within functional limits (besides LLE)      Coordination:  Coordination: Within functional limits            Tone & Sensation:   Tone: Normal  Sensation: Intact      Functional Mobility and Transfers for ADLs:  Bed Mobility:     Bed Mobility Training  Bed Mobility Training: Yes  Rolling: Modified independent  Supine to Sit: Modified independent  Sit to Supine: Modified independent  Scooting: Modified independent    Transfers:     Transfer Training  Transfer Training: Yes  Sit to Stand: Supervision  Stand to Sit: Supervision  Car Transfer: Supervision                                   Balance:      Balance  Sitting: Intact  Standing: Intact;With support (rolling walker)      ADL Assessment:          Feeding: Independent       Grooming: Supervision  Grooming Skilled Clinical Factors: standing at sink with RW to wash hands    UE Bathing: Supervision            LE Bathing: Minimal assistance  LE Bathing Skilled Clinical Factors: seated, infer supervision with AE    UE Dressing: Supervision  UE Dressing Skilled Clinical Factors: don pullover dress standing with RW    LE Dressing: Minimal assistance  LE Dressing Skilled Clinical Factors: seated, infer supervision with AE    Toileting: Supervision  Toileting Skilled Clinical Factors: standard toilet           Functional Mobility: Supervision;Increased time to complete;Adaptive equipment  Functional Mobility Skilled Clinical Factors: bed to bathroom to chair with RW, intact safety awareness          ADL Intervention and task modifications:    Pt educated on safe transfer techniques, with specific emphasis on proper hand placement to push up from  therapist and registered nurse, ortho NP    Patient Education  Education Given To: Patient;Family  Education Provided: Role of Therapy;Plan of Care;Precautions;ADL Adaptive Strategies;Energy Conservation;Mobility Training;Fall Prevention Strategies;Equipment;IADL Safety  Education Method: Demonstration;Verbal;Teach Back  Barriers to Learning: None  Education Outcome: Verbalized understanding;Demonstrated understanding    Thank you for this referral.  Clau Nails OT  Minutes: 35    Occupational Therapy Evaluation Charge Determination   History Examination Decision-Making   LOW Complexity : Brief history review  LOW Complexity: 1-3 Performance deficits relating to physical, cognitive, or psychosocial skills that result in activity limitations and/or participation restrictions LOW Complexity: No comorbidities that affect functional and  no verbal  or physical assist needed to complete eval tasks   Based on the above components, the patient evaluation is determined to be of the following complexity level: Low

## 2024-07-25 NOTE — ANESTHESIA POSTPROCEDURE EVALUATION
Department of Anesthesiology  Postprocedure Note    Patient: Gabrielle Ramírez  MRN: [id number]  YOB: 1945  Date of evaluation: 7/25/2024    Procedure Summary       Date: 07/25/24 Room / Location: [facility] MAIN OR M4 / [facility] MAIN OR    Anesthesia Start: 0813 Anesthesia Stop: 0940    Procedure: LEFT TOTAL HIP ARTHROPLASTY ANTERIOR APPROACH AND LEFT KNEE INJECTION (Hip) Diagnosis:       Primary osteoarthritis of left hip      Primary osteoarthritis of left knee      (Primary osteoarthritis of left hip [M16.12])      (Primary osteoarthritis of left knee [M17.12])    Providers: Bernard Che MD Responsible Provider: Conrado Gonzalez MD    Anesthesia Type: Regional, MAC, Spinal ASA Status: 2            Anesthesia Type: Regional, MAC, Spinal    Lenin Phase I: Lenin Score: 9    Lenin Phase II:      Anesthesia Post Evaluation    Patient location during evaluation: PACU  Patient participation: complete - patient participated  Level of consciousness: sleepy but conscious  Airway patency: patent  Nausea & Vomiting: no nausea and no vomiting  Cardiovascular status: hemodynamically stable  Respiratory status: acceptable and nasal cannula  Hydration status: stable  Multimodal analgesia pain management approach    No notable events documented.

## 2024-07-25 NOTE — ANESTHESIA PRE PROCEDURE
GFRAA 65 10/13/2020 12:30 PM    AGRATIO 2.5 10/13/2020 12:30 PM    LABGLOM 66 07/16/2024 08:05 AM    LABGLOM 55 09/14/2023 09:37 AM    GLUCOSE 93 07/16/2024 08:05 AM    CALCIUM 9.6 07/16/2024 08:05 AM    BILITOT 0.7 07/16/2024 08:05 AM    ALKPHOS 119 07/16/2024 08:05 AM    ALKPHOS 120 10/13/2020 12:30 PM    AST 19 07/16/2024 08:05 AM    ALT 20 07/16/2024 08:05 AM       POC Tests: No results for input(s): \"POCGLU\", \"POCNA\", \"POCK\", \"POCCL\", \"POCBUN\", \"POCHEMO\", \"POCHCT\" in the last 72 hours.    Coags:   Lab Results   Component Value Date/Time    PROTIME 10.3 07/16/2024 08:05 AM    INR 1.0 07/16/2024 08:05 AM       HCG (If Applicable): No results found for: \"PREGTESTUR\", \"PREGSERUM\", \"HCG\", \"HCGQUANT\"     ABGs: No results found for: \"PHART\", \"PO2ART\", \"STI8EMU\", \"JGI5ZZM\", \"BEART\", \"W3LPILIM\"     Type & Screen (If Applicable):  No results found for: \"LABABO\"    Drug/Infectious Status (If Applicable):  No results found for: \"HIV\", \"HEPCAB\"    COVID-19 Screening (If Applicable): No results found for: \"COVID19\"        Anesthesia Evaluation  Patient summary reviewed and Nursing notes reviewed   no history of anesthetic complications:   Airway: Mallampati: II  TM distance: >3 FB   Neck ROM: full  Mouth opening: > = 3 FB   Dental: normal exam         Pulmonary:Negative Pulmonary ROS and normal exam  breath sounds clear to auscultation                             Cardiovascular:Negative CV ROS    (+) hyperlipidemia        Rhythm: regular  Rate: normal                    Neuro/Psych:   (+) headaches: migraine headachesdepression/anxiety             GI/Hepatic/Renal:   (+) GERD:          Endo/Other: Negative Endo/Other ROS                    Abdominal: normal exam            Vascular: negative vascular ROS.         Other Findings:             Anesthesia Plan      MAC, regional and spinal     ASA 2     (Spinal + L DEBORA)  Induction: intravenous.    MIPS: Postoperative opioids intended and Prophylactic antiemetics

## 2024-07-25 NOTE — H&P
Date of Surgery Update:  Gabrielle Ramírez was seen and examined on the day of surgery prior to the procedure.    There were no significant clinical changes since the completion of the History and Physical.    Exam today prior to surgery showed no acute cardiac findings, no respiratory difficulty, and no abdominal complaints or pain.     Documentation of Medical Necessity:    Symptoms: pain with activity and at rest, antalgia, interferes with ADLs    Conservative Treatment: activity modification, multiple medications, injection    Physical Findings: painful AROM/PROM, antalgia on ambulation, no trochanteric pain    See PCP/Cardiology/PAT/Anesthesia record for cardiopulmonary evaluation.    Imaging: significant OA, sclerosis and osteophytes    Indications:   Failure of conservative treatments with daily pain and functional limitations.   Appropriate imaging demonstrating significant disease.  Appropriate physical findings consistent with significant degenerative joint disease.    All pertinent risks, benefits, and alternatives to operative management including continued conservative care were explained at length. The patient has elected to proceed with appropriately indicated and medically necessary total joint arthroplasty. They understand no guarantees can be given about the outcome.    Signed By: CELESTE Contreras     July 25, 2024 7:46 AM

## 2024-07-25 NOTE — PLAN OF CARE
Problem: Pain  Goal: Verbalizes/displays adequate comfort level or baseline comfort level  7/25/2024 1653 by Kenna Cortez RN  Outcome: Adequate for Discharge  7/25/2024 1434 by Kenna Cortez RN  Outcome: Progressing     Problem: Safety - Adult  Goal: Free from fall injury  7/25/2024 1653 by Kenna Cortez RN  Outcome: Adequate for Discharge  7/25/2024 1434 by Kenna Cortez RN  Outcome: Progressing     Problem: ABCDS Injury Assessment  Goal: Absence of physical injury  7/25/2024 1653 by Kenna Cortez RN  Outcome: Adequate for Discharge  7/25/2024 1434 by Kenna Cortez RN  Outcome: Progressing     Problem: Physical Therapy - Adult  Goal: By Discharge: Performs mobility at highest level of function for planned discharge setting.  See evaluation for individualized goals.  Description: FUNCTIONAL STATUS PRIOR TO ADMISSION: Patient was independent and active without use of DME. History of 2 falls over previous year.     HOME SUPPORT PRIOR TO ADMISSION: The patient lived with  who will assist at discharge.    Physical Therapy Goals  Initiated 7/25/2024  1.  Patient will move from supine to sit and sit to supine, scoot up and down, and roll side to side in bed with modified independence within 4 day(s).    2.  Patient will perform sit to stand with modified independence within 4 day(s).  3.  Patient will transfer from bed to chair and chair to bed with modified independence using the least restrictive device within 4 day(s).  4.  Patient will ambulate with modified independence for 300 feet with the least restrictive device within 4 day(s).   5.  Patient will ascend/descend 1 stairs with 1 handrail(s) with modified independence within 4 day(s).  6.  Patient will perform LE strengthening home exercise program per protocol with independence within 4 days.     7/25/2024 1441 by Gardenia Gresham, PT  Outcome: Progressing

## 2024-07-25 NOTE — ANESTHESIA PROCEDURE NOTES
Peripheral Block    Patient location during procedure: pre-op  Reason for block: post-op pain management  Start time: 7/25/2024 8:00 AM  End time: 7/25/2024 8:10 AM  Staffing  Performed: resident/CRNA   Anesthesiologist: Conrado Gonzalez MD  Resident/CRNA: Boom Rodriguez APRN - CRNA  Performed by: Boom Rodriguez APRN - CRNA  Authorized by: Conrado Gonzalez MD    Preanesthetic Checklist  Completed: patient identified, IV checked, site marked, risks and benefits discussed, surgical/procedural consents, equipment checked, pre-op evaluation, timeout performed, anesthesia consent given, oxygen available, monitors applied/VS acknowledged, fire risk safety assessment completed and verbalized and blood product R/B/A discussed and consented  Peripheral Block   Patient position: supine  Prep: ChloraPrep  Provider prep: sterile gloves and mask  Patient monitoring: cardiac monitor, continuous pulse ox, frequent blood pressure checks, IV access and oxygen  Block type: PENG  Laterality: left  Injection technique: single-shot  Guidance: ultrasound guided    Needle   Needle type: insulated echogenic nerve stimulator needle   Needle gauge: 18 G  Needle localization: ultrasound guidance  Needle length: 15 cm  Assessment   Injection assessment: negative aspiration for heme, no paresthesia on injection, local visualized surrounding nerve on ultrasound and no intravascular symptoms  Slow fractionated injection: yes  Hemodynamics: stable  Outcomes: uncomplicated and patient tolerated procedure well

## 2024-07-25 NOTE — OP NOTE
Bernard Che MD - Adult Reconstruction and Total Joint Replacement    Gabrielle Ramírez - MRN [id number] - : 1945 (78 y.o.)      Date: 2024    Pre-operative Diagnosis: Left Hip DJD ; Left knee DJD    Post-operative Diagnosis: same     Procedure:  (1) Left Total Hip Arthroplasty, Direct Anterior Approach    (2) Intraoperative Fluoroscopy    (3) Left knee injection    Implants Used:   Exactech Spartan stem size 7std, 52mm Alteon cup, 36mm/-4 ceramic head    Anesthesia: spinal + local    Surgeon: Bernard Che     Assist: CELESTE Contreras (Performing all or most of the following assistant-at-surgery services including but not limited to: proper patient positioning, sterile/prep and draping, placement of instruments/trackers, operative exposure, minor portions of bone / soft tissue excision, final irrigation and debridement, deep and superficial closure, application of final dressings)    EBL: 225cc     Drains: none     Specimens: none     Complications: none     Condition: stable to PACU     Brief History: Longstanding hip pain, unresponsive to conservative treatment. The risks, benefits, and alternatives to total hip replacement were thoroughly explained. The patient understood no guarantees could be given about the outcome of the procedure and wished to proceed.     Description of Procedure: After being identified in the preoperative holding area and having their operative site marked, the patient was brought back to the operating room where they underwent anesthesia to good effect. They were  placed in the supine position with a bump under the hip. The operative extremity was then prepped and draped in the usual fashion using sterile technique. Preoperative antibiotics had been administered. An appropriate time-out was performed. An incision was made 2 fingerbreadths lateral and distal to the ASIS. The skin flaps were developed down to the tensor fascia. This was divided sharply. Blunt

## 2024-07-25 NOTE — DISCHARGE SUMMARY
(TYLENOL) 500 MG tablet Past Week  Yes No   Sig: Take by mouth every 6 hours as needed   ibuprofen (ADVIL;MOTRIN) 200 MG tablet Past Week  Yes No   Sig: Take 1 tablet by mouth every 6 hours as needed for Pain   naproxen (NAPROSYN) 500 MG tablet Not Taking  Yes No   Sig: Take 1 tablet by mouth 2 times daily (with meals)   Patient not taking: Reported on 7/25/2024      Facility-Administered Medications: None        Allergies:  No Known Allergies     Hospital Course:  The patient underwent surgery.  Complications:  None; patient tolerated the procedure well. Was taken to the PACU in stable condition and then transferred to the ortho floor.      Perioperative Antibiotics:  Ancef     Postoperative Pain Management:  Tramadol    DVT Prophylaxis:   Aspirin 81mg BID     Postoperative transfusions:    Number of units banked PRBCs =   none     Post Op complications: none    Hemoglobin at discharge:    Lab Results   Component Value Date/Time    HGB 12.5 07/16/2024 08:05 AM    INR 1.0 07/16/2024 08:05 AM       Incision - clean, dry and intact. No significant erythema or swelling. Wound appears to be healing without any evidence of infection.  Neurovascular exam found to be within normal limits.     Physical Therapy started following surgery and participated in bed mobility, transfers and ambulation.            Discharged to: Home with .    Condition on Discharge:   Good    Discharge instructions:  - Anticoagulate with Aspirin   - Take pain medications as prescribed  - Resume pre hospital diet      - Discharge activity: activity as tolerated  - Ambulate with assistive device as needed.  - Weight bearing status WBAT  - Wound Care Keep wound clean and dry.  See discharge instruction sheet.            -DISCHARGE MEDICATION LIST        Medication List        START taking these medications      aspirin 81 MG EC tablet  Take 1 tablet by mouth in the morning and at bedtime     bisacodyl 10 MG suppository  Commonly known as:  DULCOLAX  Place 1 suppository rectally daily as needed for Constipation  Start taking on: July 26, 2024     traMADol 50 MG tablet  Commonly known as: ULTRAM  Take 1 tablet by mouth every 6 hours as needed for Pain for up to 7 days. Max Daily Amount: 200 mg            CONTINUE taking these medications      acetaminophen 500 MG tablet  Commonly known as: TYLENOL     Gemtesa 75 MG Tabs tablet  Generic drug: vibegron     ibuprofen 200 MG tablet  Commonly known as: ADVIL;MOTRIN               Where to Get Your Medications        These medications were sent to [id number] - [place], [place] - [address] [phone] - F [phone]  [facility] [facility] [address]      Phone: [phone]   aspirin 81 MG EC tablet  bisacodyl 10 MG suppository  traMADol 50 MG tablet      per medical continuation form      -Follow up in office in 2 weeks      Signed:  Kyele Spencer PA-C, [facility]    Orthopaedic Physician Assistant    7/25/2024  2:45 PM

## 2024-07-25 NOTE — ANESTHESIA PROCEDURE NOTES
Spinal Block    Patient location during procedure: pre-op  End time: 7/25/2024 8:00 AM  Reason for block: primary anesthetic  Staffing  Performed: resident/CRNA   Anesthesiologist: Conrado Gonzalez MD  Resident/CRNA: Boom Rodriguez APRN - CRNA  Performed by: Boom Rodriguez APRN - CRNA  Authorized by: Conrado Gonzalez MD    Spinal Block  Patient position: sitting  Prep: DuraPrep  Patient monitoring: cardiac monitor, continuous pulse ox, frequent blood pressure checks and oxygen  Approach: midline  Location: L2/L3  Provider prep: mask and sterile gloves  Local infiltration: lidocaine  Needle  Needle type: pencil-tip   Needle gauge: 24 G  Needle length: 5 in  Assessment  Sensory level: T10  Swirl obtained: Yes  CSF: clear  Attempts: 2  Hemodynamics: stable  Preanesthetic Checklist  Completed: patient identified, IV checked, site marked, risks and benefits discussed, surgical/procedural consents, equipment checked, pre-op evaluation, timeout performed, anesthesia consent given, oxygen available, monitors applied/VS acknowledged, fire risk safety assessment completed and verbalized and blood product R/B/A discussed and consented

## 2024-07-25 NOTE — CARE COORDINATION
Pt is cleared for d/c from a CM standpoint.     3:17 p.m. [facility] accepted pt.  CM placed on AVS.     2:56 p.m. CM met with pt at bedside to discuss HH.  Pt in agreement without a preference.  CM will send referrals.  IMM notice provided previously and still valid.  CM provided pt with  notice and placed signed copy on chart.     CM following pt on the ortho unit.         07/25/24 3371   Services At/After Discharge   Transition of Care Consult (CM Consult) Discharge Planning   Services At/After Discharge Home Health  ([facility])   Mode of Transport at Discharge Other (see comment)  (spouse)   Condition of Participation: Discharge Planning   The Patient and/or Patient Representative was provided with a Choice of Provider? Patient   The Patient and/Or Patient Representative agree with the Discharge Plan? Yes   Freedom of Choice list was provided with basic dialogue that supports the patient's individualized plan of care/goals, treatment preferences, and shares the quality data associated with the providers?  Yes         Rajwinder Wetzel LMSW  Supervisee in Social Work  Care Management, [facility]  [phone]

## 2024-07-25 NOTE — PROGRESS NOTES
Ortho / Neurosurgery Progress Note    POD# 0  s/p LEFT TOTAL HIP ARTHROPLASTY ANTERIOR APPROACH AND LEFT KNEE INJECTION   Pt seen with no visitors at home, she is awake and pleasant, reports pain in her left KNEE but no hip pain. She lives with her  who is somewhere in the hospital. She will like to go home today. She has voided and is tolerating diet.    Patient in bed    VSS Afebrile.    Visit Vitals  BP (!) 139/90   Pulse 75   Temp 97.9 °F (36.6 °C) (Axillary)   Resp 16   Ht 1.727 m (5' 8\")   Wt 72.1 kg (158 lb 15.2 oz)   SpO2 99%   BMI 24.17 kg/m²       Voiding status: Voiding independently.          Labs    Lab Results   Component Value Date/Time    HGB 12.5 07/16/2024 08:05 AM      Lab Results   Component Value Date/Time    INR 1.0 07/16/2024 08:05 AM      Lab Results   Component Value Date/Time     07/16/2024 08:05 AM    K 3.9 07/16/2024 08:05 AM     07/16/2024 08:05 AM    CO2 22 07/16/2024 08:05 AM    BUN 25 07/16/2024 08:05 AM     Recent Glucose Results:   Glucose   Date Value Ref Range Status   07/16/2024 93 65 - 100 mg/dL Final   09/14/2023 125 (H) 65 - 100 mg/dL Final   10/13/2020 81 65 - 99 mg/dL Final           Body mass index is 24.17 kg/m². : A BMI > 30 is classified as obesity and > 40 is classified as morbid obesity.     Awake and alert. No acute distress.    Dressing: Silver Dressing C.D.I.   No significant erythema or swelling  Cryotherapy in place over incision.   BLE sensation to light touch intact  BLE motor intact. Strength 5/5    SCD for mechanical DVT proph while in bed        PLAN:  1) PT BID - WBAT.   2) DVT Prophylaxis: Aspirin 81 mg BID for DVT Prophylaxis   3) GI Prophylaxis - PEPCID  4) Pain control - scheduled tylenol  and toradol, and prn  tramadol    5) Readiness for discharge:     [x] Vital Signs stable    [x] Labs stable    [x] + Voiding    [x] Wound intact, drainage minimal    [x] Tolerating PO intake     [] Cleared by PT (OT if applicable) for discharge   [x]  Adequate pain control on oral medication alone        Discharge Plan: Home with Home Health  after PT          Kylee Spencer [facility]-C, [facility]    Orthopedic Physician Assistant

## 2024-07-25 NOTE — PERIOP NOTE
06:25= ambulated independently to Pre OP, A&Ox4, consents verified (3); changed into gown using CHG; voided in BR; no mepilex applied d/t spinal; warming blanket applied.    07:00= Dr. Gonzalez in to discuss Anesthesia. LR IVF infusing as bolus per protocol.    07:38= Dr. Che in to discuss surgery.    07:43= assisted up to BR to void; noticed blanchable erythema in gluteal fold; mepilex dsg applied to protect; below spinal site.     07:51= timeout performed with Julio, CRNA for spinal; 2LO2NC placed on pt and frequent VS started every 2 minutes.     08:04= timeout performed with Julio, CRNA for left hip nerve block; 2LO2NC remains on pt and frequent VS cycling.

## 2024-11-07 NOTE — PROGRESS NOTES
[facility]  Joint/Spine Preoperative Instructions    Surgery Date July 25          Time of Arrival to be called  Contact# [phone]     1. On the day of your surgery, please report to the Surgical Services Registration Desk and sign in at your designated time. The Surgery Center is located to the right of the Emergency Room.     2. You must have someone with you to drive you home. You should not drive a car for 24 hours following surgery. Please make arrangements for a friend or family member to stay with you for the first 24 hours after your surgery.    3. No food after midnight July 24.  Medications morning of surgery should be taken with a sip of water.  Please follow pre-surgery drink instructions that were given at your Pre Admission Testing appointment.      4. We recommend you do not drink any alcoholic beverages for 24 hours before and after your surgery.    5. Contact your surgeon’s office for instructions on the following medications: non-steroidal anti-inflammatory drugs (i.e. Advil, Aleve), vitamins, and supplements. (Some surgeon’s will want you to stop these medications prior to surgery and others may allow you to take them)  **If you are currently taking Plavix, Coumadin, Aspirin and/or other blood-thinning agents, contact your surgeon for instructions.** Your surgeon will partner with the physician prescribing these medications to determine if it is safe to stop or if you need to continue taking.  Please do not stop taking these medications without instructions from your surgeon    6. Wear comfortable clothes.  Wear glasses instead of contacts.  Do not bring any money or jewelry. Please bring picture ID, insurance card, and any prearranged co-payment or hospital payment.  Do not wear make-up, particularly mascara the morning of your surgery.  Do not wear nail polish, particularly if you are having foot /hand surgery.  Wear your hair loose or down, no ponytails, 
Hibiclens/Chlorhexidine    Preventing Infections Before and After - Your Surgery    IMPORTANT INSTRUCTIONS    Please read and follow these instructions carefully. If you are unable to comply with the below instructions your procedure will be cancelled.       Every Night for Three (3) nights before your surgery:  Shower with an antibacterial soap, such as Dial, or the soap provided at your preassessment appointment. A shower is better than a bath for cleaning your skin.  If needed, ask someone to help you reach all areas of your body. Don’t forget to clean your belly button with every shower.    The night before your surgery:   If you lose your Hibiclens/chlorhexidine please contact surgery center or you can purchase it at a local pharmacy  On the night before your surgery, shower with an antibacterial soap, such as Dial, or the soap provided at your preassessment appointment.   With bottle of Hibiclens/Chlorhexidine in hand, turn water off.  Apply Hibiclens antiseptic skin cleanser with a clean, freshly washed washcloth.  Gently apply to your body from chin to toes (except the genital area) and especially the area(s) where your incision(s) will be.  Leave Hibiclens/Chlorhexidine on your skin for at least 20 seconds.    CAUTION: If needed, Hibiclens/chlorhexidine may be used to clean the folds of skin of the legs (such as in the area of the groin) and on your buttocks and hips. However, do not use Hibiclens/Chlorhexidine above the neck or in the genital area (your bottom) or put inside any area of your body.  Turn the water back on and rinse.  Dry gently with a clean, freshly washed towel.  After your shower, do not use any powder, deodorant, perfumes or lotion.  Use clean, freshly washed towels and washcloths every time you shower.  Wear clean, freshly washed pajamas to bed the night before surgery.  Sleep on clean, freshly washed sheets.  Do not allow pets to sleep in your bed with you.        The Morning of your 
Incentive Spirometer        Using the incentive spirometer helps expand the small air sacs of your lungs, helps you breathe deeply, and helps improve your lung function.  Use your incentive spirometer twice a day (10 breaths each time) prior to surgery.      How to Use Your Incentive Spirometer:  Hold the incentive spirometer in an upright position.   Breathe out as usual.   Place the mouthpiece in your mouth and seal your lips tightly around it.   Take a deep breath.  Breathe in slowly and as deeply as possible. Keep the blue flow rate guide between the arrows.   Hold your breath as long as possible. Then exhale slowly and allow the piston to fall to the bottom of the column.   Rest for a few seconds and repeat steps one through five at least 10 times.     PAT Tidal Volume______2000____________  x_____2___________  Date___7/16/24____________________    BRING THE INCENTIVE SPIROMETER WITH YOU TO THE HOSPITAL ON THE DAY OF YOUR SURGERY.  Opportunity given to ask and answer questions as well as to observe return demonstration.    Patient signature_____________________________    Witness____________________________  
Orthopedic and Spine Patients:  Instructions on When You Can   Eat or Drink Before Surgery      You have been provided 2 pre-surgery drinks received at your pre-admission testing appointment.    Night before surgery:  You should drink one bottle of the  pre-surgery drink at bedtime. No food after midnight!    Day of Surgery:  Complete 2nd bottle of the pre-surgery drink 1 hour prior to arrival at hospital.  For questions call Pre-Admission Testing at [phone].  They are available from 8:00am-5:00pm, Monday through Friday.  
Patient attended the Joint Replacement Education Class at [facility]. The content of the class was presented using a power point presentation specific for patients undergoing hip and knee replacement surgery. The [facility] Joint Replacement Education Handbook was given to the patient.  Preparing for surgery, day of surgery routine and expectations, discharge process and help at home expectations, nutrition,medications, infection control, pain management, DVT prevention, ice therapy and safety were reviewed in class. Opportunity for questions provided, patient verbalized understanding of instructions.    PROMis and HOOS Questionnaires completed on 7/16/2024.  [employer] access (active/pending/declined) active.    Prehab completed during PAT visit on 7/16/2024.  Patient reports does not have RW for after surgery.    Order placed through [facility] to obtain RW from [employer] on DOS 7/25/2024.  
 2 []  3  [x]  4   2.  Moving from lying on your back to sitting on the side of a flat bed without using bedrails? []  1 []  2 []  3  [x]  4   3.  Moving to and from a bed to a chair (including a wheelchair)? []  1 []  2 []  3  [x]  4   4. Standing up from a chair using your arms (e.g. wheelchair or bedside chair)? []  1 []  2 []  3  [x]  4   5.  Walking in hospital room? []  1 []  2 []  3  [x]  4   6.  Climbing 3-5 steps with a railing? []  1 []  2 []  3  [x]  4     Raw Score: 24/24                            Cutoff score ?171,2,3 had higher odds of discharging home with home health or need of SNF/IPR.    1. Cielo Shen, Christel Fiore, Cezar Celis, Suzie Lew, Armando Boss, Cedrick Shen.  Validity of the AM-PAC “6-Clicks” Inpatient Daily Activity and Basic Mobility Short Forms. Physical Therapy Mar 2014, [phone]; DOI: 10.2522/ptj.[id number]  2. Patrick LAGUNAS, Elaine J, Angelo J, Amy J. Association of AM-PAC \"6-Clicks\" Basic Mobility and Daily Activity Scores With Discharge Destination. Phys Ther. 2021 Apr 4;101(4):vmsw753. doi: 10.1093/ptj/xfzr080. PMID: 12209738.  3. Cheryl KENNEDY, Reina D, Delores S, Melisa K, Maddi S. Activity Measure for Post-Acute Care \"6-Clicks\" Basic Mobility Scores Predict Discharge Destination After Acute Care Hospitalization in Select Patient Groups: A Retrospective, Observational Study. Arch Rehabil Res Clin Transl. 2022 Jul 16;4(3):322658. doi: 10.1016/j.arrct.2022.098208. PMID: 30739595; PMCID: SEQ2600772.  4. Ac GONZALES, Amanda S, Alexei W, Rosa P. AM-PAC Short Forms Manual 4.0. Revised 2/2020.        8 min [x]Eval - untimed                      Therapeutic Procedures:  Tx Min Procedure, Rationale, Specifics   15 [id number] Therapeutic Activity (timed):  use of dynamic activities replicating functional movements to increase ROM, strength, coordination, balance, and proprioception in order to improve patient's ability to progress to PLOF and address remaining 
Severe  2. Walking on an uneven surface: Severe    Function, daily living - Please indicate the degree of difficulty you have experienced in the last week due to your hip  3. Rising from sitting : Severe  4. Bending to the floor/ an object: Severe  5. Lying in bed (turning over, maintaining hip position): Severe  6. Sitting : Moderate    Raw Score  HOOS Jr. Raw Score: 17       
<-- Click to add NO significant Past Surgical History

## 2025-07-03 ENCOUNTER — FOLLOWUP TELEPHONE ENCOUNTER (OUTPATIENT)
Facility: HOSPITAL | Age: 80
End: 2025-07-03

## 2025-07-03 NOTE — TELEPHONE ENCOUNTER
Postop HOOs survey completed 5/22/2025.  HIP DISABILITY AND OSTEOARTHRITIS OUTCOME SCORE    Pain - What amount of hip pain have you experienced the last week during the following activities?  1. Going up or down stairs: (Patient-Rptd) 0  2. Walking on an uneven surface: (Patient-Rptd) 0        Function, daily living - Please indicate the degree of difficulty you have experienced in the last week due to your hip  3. Rising from sitting : (Patient-Rptd) 0  4. Bending to the floor/ an object: (Patient-Rptd) 0  5. Lying in bed (turning over, maintaining hip position): (Patient-Rptd) 0  6. Sitting : (Patient-Rptd) 0        Raw Score  HOOS Jr. Raw Score: (Patient-Rptd) 0          7/3/2025: Call to patient to complete postop HOOs survey.  LVM requesting patient to complete in MyChart or to  for asst.

## (undated) DEVICE — GLOVE SURG SZ 8 L12IN FNGR THK79MIL GRN LTX FREE

## (undated) DEVICE — DRAPE,ORTHOMAX ,HIP,W/POUCHES: Brand: MEDLINE

## (undated) DEVICE — YANKAUER,SMOOTH HANDLE,HIGH CAPACITY: Brand: MEDLINE INDUSTRIES, INC.

## (undated) DEVICE — TRANSFER SET 3": Brand: MEDLINE INDUSTRIES, INC.

## (undated) DEVICE — C-ARM: Brand: UNBRANDED

## (undated) DEVICE — SUTURE VICRYL SZ 0 L36IN ABSRB UD L36MM CT-1 1/2 CIR J946H

## (undated) DEVICE — APPLICATOR MEDICATED 26 CC SOLUTION HI LT ORNG CHLORAPREP

## (undated) DEVICE — SUTURE STRATAFIX SZ 3-0 30CM NONABSORB UD 26MM FS 3/8 SXMP2B412

## (undated) DEVICE — AGENT HEMOSTATIC SURGIFLOW MATRIX KIT W/THROMBIN

## (undated) DEVICE — OSCILLATING TIP SAW CARTRIDGE: Brand: PRECISION FALCON

## (undated) DEVICE — SOLUTION IRRIG 1000ML 0.9% SOD CHL USP POUR PLAS BTL

## (undated) DEVICE — SUTURE ABSRB L30CM 2-0 VLT SPRL PDS + STRATAFIX SXPP1B410

## (undated) DEVICE — LIQUIBAND RAPID ADHESIVE 36/CS 0.8ML: Brand: MEDLINE

## (undated) DEVICE — GLOVE ORTHO 8   MSG9480

## (undated) DEVICE — ELECTRODE BLDE L4IN NONINSULATED EDGE

## (undated) DEVICE — PREP KIT PEEL PTCH POVIDONE IOD

## (undated) DEVICE — DRESSING FOAM 4X6 DISP POSTOP MEPILEX BORD AG

## (undated) DEVICE — SUTURE STRATAFIX SYMMETRIC SZ 1 L18IN ABSRB VLT CT1 L36CM SXPP1A404

## (undated) DEVICE — TOTAL JOINT-MRMC: Brand: MEDLINE INDUSTRIES, INC.

## (undated) DEVICE — NEEDLE SPNL L3.5IN PNK HUB S STL REG WALL FIT STYL W/ QNCKE

## (undated) DEVICE — SOLUTION IRRIG 1000ML STRL H2O USP PLAS POUR BTL

## (undated) DEVICE — 3M™ IOBAN™ 2 ANTIMICROBIAL INCISE DRAPE 6651EZ: Brand: IOBAN™ 2